# Patient Record
Sex: FEMALE | Race: WHITE | NOT HISPANIC OR LATINO | Employment: OTHER | URBAN - METROPOLITAN AREA
[De-identification: names, ages, dates, MRNs, and addresses within clinical notes are randomized per-mention and may not be internally consistent; named-entity substitution may affect disease eponyms.]

---

## 2017-07-28 PROBLEM — E89.2 S/P PARATHYROIDECTOMY: Status: ACTIVE | Noted: 2017-07-28

## 2017-07-28 PROBLEM — Z98.890 S/P PARATHYROIDECTOMY: Status: ACTIVE | Noted: 2017-07-28

## 2017-07-28 PROBLEM — Z90.89 S/P PARATHYROIDECTOMY: Status: ACTIVE | Noted: 2017-07-28

## 2022-06-03 ENCOUNTER — TELEPHONE (OUTPATIENT)
Dept: FAMILY MEDICINE CLINIC | Facility: CLINIC | Age: 57
End: 2022-06-03

## 2022-06-03 NOTE — TELEPHONE ENCOUNTER
Caller: Tea Leggett    Relationship: Self    Best call back number: [phone]    What is the best time to reach you: BY THIS EVENING    Who are you requesting to speak with (clinical staff, provider,  specific staff member): PROVIDER    What was the call regarding: PATIENT HAS A KNOT THAT IS MORE LIKE A BONE SPUR THAT HAS POPPED UP AND IS VERY PAINFUL TO WHERE SHE CAN'T HARDLY PUT A SHOE/FLIPFLOP OR SANDAL ON.  PLUS SHE IS HAVING NUMBNESS IN HER PINKY TOE.    Do you require a callback: YES PLEASE CALL HER BACK.

## 2022-11-03 ENCOUNTER — TELEPHONE (OUTPATIENT)
Dept: FAMILY MEDICINE CLINIC | Facility: CLINIC | Age: 57
End: 2022-11-03

## 2023-01-12 ENCOUNTER — TELEPHONE (OUTPATIENT)
Dept: FAMILY MEDICINE CLINIC | Facility: CLINIC | Age: 58
End: 2023-01-12

## 2023-01-12 NOTE — TELEPHONE ENCOUNTER
Hub staff attempted to follow warm transfer process and was unsuccessful     Caller: Tea Leggett    Relationship to patient: Self    Best call back number: [phone]    Patient is needing: PLEASE CHANGE PATIENT'S LABS TO Monday MORNING AROUND 9 AM.

## 2023-01-13 ENCOUNTER — TELEPHONE (OUTPATIENT)
Dept: FAMILY MEDICINE CLINIC | Facility: CLINIC | Age: 58
End: 2023-01-13

## 2023-01-13 NOTE — TELEPHONE ENCOUNTER
PATIENT CALLED FOR A STATUS ON THIS BECAUSE SHE STATED SHE NEEDS A RESPONSE BEFORE THE WEEKEND.    PLEASE ADVISE PATIENT ASAP.    CONTACT: [phone] (mobile)

## 2023-01-13 NOTE — TELEPHONE ENCOUNTER
Caller: Tea Leggett    Relationship: Self    Best call back number: [phone]    What was the call regarding: PATIENT WAS CALLING TO CHECK ON THE TEST RESULTS FOR HER URINE ANALYSIS. THE CURRENT MEDICATION THAT MARCOS GAVE HER IS NOT HELPING, SHE STILL HAS VAGINAL DISCHARGE AND IS STILL IN PAIN WHEN SHE URINATES.     PLEASE ADVISE.     Do you require a callback: YES

## 2023-05-10 ENCOUNTER — OFFICE VISIT (OUTPATIENT)
Dept: FAMILY MEDICINE CLINIC | Facility: CLINIC | Age: 58
End: 2023-05-10
Payer: MEDICARE

## 2023-05-10 VITALS
RESPIRATION RATE: 14 BRPM | TEMPERATURE: 97.6 F | DIASTOLIC BLOOD PRESSURE: 73 MMHG | HEIGHT: 62 IN | HEART RATE: 76 BPM | SYSTOLIC BLOOD PRESSURE: 144 MMHG | OXYGEN SATURATION: 99 % | BODY MASS INDEX: 30.51 KG/M2 | WEIGHT: 165.8 LBS

## 2023-05-10 DIAGNOSIS — B00.9 HERPES: ICD-10-CM

## 2023-05-10 DIAGNOSIS — R10.9 ABDOMINAL PAIN, UNSPECIFIED ABDOMINAL LOCATION: Primary | ICD-10-CM

## 2023-05-10 LAB
BILIRUB BLD-MCNC: NEGATIVE MG/DL
CLARITY, POC: ABNORMAL
COLOR UR: YELLOW
EXPIRATION DATE: ABNORMAL
GLUCOSE UR STRIP-MCNC: NEGATIVE MG/DL
KETONES UR QL: NEGATIVE
LEUKOCYTE EST, POC: NEGATIVE
Lab: ABNORMAL
NITRITE UR-MCNC: NEGATIVE MG/ML
PH UR: 7 [PH] (ref 5–8)
PROT UR STRIP-MCNC: NEGATIVE MG/DL
RBC # UR STRIP: ABNORMAL /UL
SP GR UR: 1.01 (ref 1–1.03)
UROBILINOGEN UR QL: ABNORMAL

## 2023-05-10 PROCEDURE — 99214 OFFICE O/P EST MOD 30 MIN: CPT | Performed by: NURSE PRACTITIONER

## 2023-05-10 PROCEDURE — 3078F DIAST BP <80 MM HG: CPT | Performed by: NURSE PRACTITIONER

## 2023-05-10 PROCEDURE — 81003 URINALYSIS AUTO W/O SCOPE: CPT | Performed by: NURSE PRACTITIONER

## 2023-05-10 PROCEDURE — 3077F SYST BP >= 140 MM HG: CPT | Performed by: NURSE PRACTITIONER

## 2023-05-10 RX ORDER — VALACYCLOVIR HYDROCHLORIDE 500 MG/1
500 TABLET, FILM COATED ORAL DAILY
Qty: 90 TABLET | Refills: 3 | Status: SHIPPED | OUTPATIENT
Start: 2023-05-10

## 2023-05-10 RX ORDER — LEVOTHYROXINE SODIUM 0.1 MG/1
100 TABLET ORAL DAILY
Qty: 90 TABLET | Refills: 2 | Status: SHIPPED | OUTPATIENT
Start: 2023-05-10

## 2023-05-10 NOTE — PROGRESS NOTES
"Chief Complaint  Abdominal Pain and Back Pain    Subjective        Tea DANIELS presents to [facility]  History of Present Illness  Recent diagnosis of herpes, needs valacyclovir gets for 5 episodes a year.  Already notified ex-partner.    Sunday she had some mid pelvic pain suprapubic pain that radiated to her back, some crampy-like pain felt like a menstrual cycle but she does not have her uterus she still has ovaries but she had no fever chills other associated symptoms really no dysuria frequency urgency or bowel or bladder issues, her appetite remains good and thankfully pain has completely resolved she went ahead and kept her appointment as she needed some  Prophylactic therapy for recurrent HSV infection.  She has no active HSV infection no discharge,  Her other acute issues.    Recently she was found to have herpes,    For her posterior lower back confirmed with culture  More in her mid back with her buttock crevice  She believes she has had some recurrent symptoms over the year   She discussed taking Valtrex daily  With previous provider and would like this to be prescribed    Abdominal Pain    Back Pain  Associated symptoms include abdominal pain.       Objective   Vital Signs:  /73   Pulse 76   Temp 97.6 °F (36.4 °C) (Temporal)   Resp 14   Ht 157.5 cm (62\")   Wt 75.2 kg (165 lb 12.8 oz)   SpO2 99%   BMI 30.33 kg/m²   Estimated body mass index is 30.33 kg/m² as calculated from the following:    Height as of this encounter: 157.5 cm (62\").    Weight as of this encounter: 75.2 kg (165 lb 12.8 oz).          Physical Exam  Vitals reviewed.   Constitutional:       General: She is not in acute distress.     Appearance: Normal appearance. She is well-developed. She is not ill-appearing, toxic-appearing or diaphoretic.   HENT:      Head: Normocephalic.      Nose: Nose normal.   Eyes:      General: No scleral icterus.     Conjunctiva/sclera: Conjunctivae normal.      " Pupils: Pupils are equal, round, and reactive to light.   Neck:      Thyroid: No thyromegaly.      Vascular: No JVD.   Cardiovascular:      Rate and Rhythm: Normal rate and regular rhythm.      Heart sounds: Normal heart sounds. No murmur heard.    No friction rub. No gallop.   Pulmonary:      Effort: Pulmonary effort is normal. No respiratory distress.      Breath sounds: Normal breath sounds. No stridor. No wheezing or rales.   Abdominal:      General: Bowel sounds are normal. There is no distension.      Palpations: Abdomen is soft.      Tenderness: There is no abdominal tenderness.      Comments: No hepatosplenomegaly, no ascites, normal exam no tenderness, no significant pelvic tenderness or suprapubic tenderness no distention of her bladder no guarding or rebound normal exam no CVA tenderness   Musculoskeletal:         General: No tenderness.      Cervical back: Neck supple.   Lymphadenopathy:      Cervical: No cervical adenopathy.   Skin:     General: Skin is warm and dry.      Findings: No erythema or rash.   Neurological:      General: No focal deficit present.      Mental Status: She is alert and oriented to person, place, and time. Mental status is at baseline.      Deep Tendon Reflexes: Reflexes are normal and symmetric.   Psychiatric:         Mood and Affect: Mood normal.         Behavior: Behavior normal.         Thought Content: Thought content normal.         Judgment: Judgment normal.        Result Review :                Assessment and Plan   Diagnoses and all orders for this visit:    1. Abdominal pain, unspecified abdominal location (Primary)  Comments:  resolved  Orders:  -     POC Urinalysis Dipstick, Automated    2. Herpes  -     valACYclovir (Valtrex) 500 MG tablet; Take 1 tablet by mouth Daily.  Dispense: 90 tablet; Refill: 3    Other orders  -     levothyroxine (SYNTHROID, LEVOTHROID) 100 MCG tablet; Take 1 tablet by mouth Daily.  Dispense: 90 tablet; Refill: 2             Follow Up   No  follow-ups on file.  Patient was given instructions and counseling regarding her condition or for health maintenance advice. Please see specific information pulled into the AVS if appropriate.     There are no Patient Instructions on file for this visit.     Discharge instructions patient continue present plan,  Abdominal pain has resolved  And she is having no pelvic pain pressure nausea vomiting fever chest pain shortness of breath flank pain or other issues she has no active infection  For prevention of recurrent herpes simplex virus, Valtrex 500 mg daily  We will start lower dose if need be increased to 1000 the liver kidney function yearly  Continue present thyroid, continue healthy diet and exercise recheck blood pressure follow-up for routine physicals make sure she is up-to-date with her screenings as well mammogram etc.  Any recurrent abdominal pain she will need a CAT scan further evaluation any severe pain fever chills emergency room

## 2023-05-31 ENCOUNTER — TELEPHONE (OUTPATIENT)
Dept: FAMILY MEDICINE CLINIC | Facility: CLINIC | Age: 58
End: 2023-05-31

## 2023-05-31 NOTE — TELEPHONE ENCOUNTER
Hub staff attempted to follow warm transfer process and was unsuccessful     Caller: Tea DANIELS    Relationship to patient: Self    Best call back number: [phone]    Patient is needing: PATIENT STATED SHE THOUGHT SHE HAD A LAB APPOINTMENT THIS Friday 06/02/23, BUT RECEIVED A NOTIFICATION THAT SHE HAS AN OFFICE VISIT.    SHE STATED SHE HAS AN APPOINTMENT NEXT Friday 06/09/23 FOR A 6 MONTH FOLLOW UP.    SHE WOULD LIKE TO CONFIRM THAT THIS Friday IS LABS ONLY.    PLEASE CALL.

## 2023-06-02 DIAGNOSIS — I10 ESSENTIAL HYPERTENSION: ICD-10-CM

## 2023-06-02 DIAGNOSIS — N30.00 ACUTE CYSTITIS WITHOUT HEMATURIA: ICD-10-CM

## 2023-06-03 LAB
ALBUMIN SERPL-MCNC: 4.5 G/DL (ref 3.5–5.2)
ALBUMIN/GLOB SERPL: 2.4 G/DL
ALP SERPL-CCNC: 119 U/L (ref 39–117)
ALT SERPL-CCNC: 40 U/L (ref 1–33)
AST SERPL-CCNC: 33 U/L (ref 1–32)
BASOPHILS # BLD AUTO: 0.04 10*3/MM3 (ref 0–0.2)
BASOPHILS NFR BLD AUTO: 0.7 % (ref 0–1.5)
BILIRUB SERPL-MCNC: 0.4 MG/DL (ref 0–1.2)
BUN SERPL-MCNC: 13 MG/DL (ref 6–20)
BUN/CREAT SERPL: 14.6 (ref 7–25)
CALCIUM SERPL-MCNC: 9.8 MG/DL (ref 8.6–10.5)
CHLORIDE SERPL-SCNC: 108 MMOL/L (ref 98–107)
CHOLEST SERPL-MCNC: 234 MG/DL (ref 0–200)
CO2 SERPL-SCNC: 26.9 MMOL/L (ref 22–29)
CREAT SERPL-MCNC: 0.89 MG/DL (ref 0.57–1)
EGFRCR SERPLBLD CKD-EPI 2021: 76.7 ML/MIN/1.73
EOSINOPHIL # BLD AUTO: 0.13 10*3/MM3 (ref 0–0.4)
EOSINOPHIL NFR BLD AUTO: 2.1 % (ref 0.3–6.2)
ERYTHROCYTE [DISTWIDTH] IN BLOOD BY AUTOMATED COUNT: 13.1 % (ref 12.3–15.4)
GLOBULIN SER CALC-MCNC: 1.9 GM/DL
GLUCOSE SERPL-MCNC: 86 MG/DL (ref 65–99)
HCT VFR BLD AUTO: 36.8 % (ref 34–46.6)
HDLC SERPL-MCNC: 114 MG/DL (ref 40–60)
HGB BLD-MCNC: 12.4 G/DL (ref 12–15.9)
IMM GRANULOCYTES # BLD AUTO: 0.01 10*3/MM3 (ref 0–0.05)
IMM GRANULOCYTES NFR BLD AUTO: 0.2 % (ref 0–0.5)
LDLC SERPL CALC-MCNC: 110 MG/DL (ref 0–100)
LDLC/HDLC SERPL: 0.95 {RATIO}
LYMPHOCYTES # BLD AUTO: 2.4 10*3/MM3 (ref 0.7–3.1)
LYMPHOCYTES NFR BLD AUTO: 39 % (ref 19.6–45.3)
MCH RBC QN AUTO: 32.4 PG (ref 26.6–33)
MCHC RBC AUTO-ENTMCNC: 33.7 G/DL (ref 31.5–35.7)
MCV RBC AUTO: 96.1 FL (ref 79–97)
MONOCYTES # BLD AUTO: 0.57 10*3/MM3 (ref 0.1–0.9)
MONOCYTES NFR BLD AUTO: 9.3 % (ref 5–12)
NEUTROPHILS # BLD AUTO: 3 10*3/MM3 (ref 1.7–7)
NEUTROPHILS NFR BLD AUTO: 48.7 % (ref 42.7–76)
NRBC BLD AUTO-RTO: 0 /100 WBC (ref 0–0.2)
PLATELET # BLD AUTO: 249 10*3/MM3 (ref 140–450)
POTASSIUM SERPL-SCNC: 4.2 MMOL/L (ref 3.5–5.2)
PROT SERPL-MCNC: 6.4 G/DL (ref 6–8.5)
RBC # BLD AUTO: 3.83 10*6/MM3 (ref 3.77–5.28)
SODIUM SERPL-SCNC: 145 MMOL/L (ref 136–145)
TRIGL SERPL-MCNC: 57 MG/DL (ref 0–150)
TSH SERPL DL<=0.005 MIU/L-ACNC: 0.72 UIU/ML (ref 0.27–4.2)
UNABLE TO VOID: NORMAL
VLDLC SERPL CALC-MCNC: 10 MG/DL (ref 5–40)
WBC # BLD AUTO: 6.15 10*3/MM3 (ref 3.4–10.8)

## 2023-06-09 ENCOUNTER — OFFICE VISIT (OUTPATIENT)
Dept: FAMILY MEDICINE CLINIC | Facility: CLINIC | Age: 58
End: 2023-06-09
Payer: MEDICARE

## 2023-06-09 VITALS
HEIGHT: 62 IN | WEIGHT: 173.7 LBS | TEMPERATURE: 97.6 F | BODY MASS INDEX: 31.96 KG/M2 | HEART RATE: 78 BPM | RESPIRATION RATE: 14 BRPM | DIASTOLIC BLOOD PRESSURE: 60 MMHG | OXYGEN SATURATION: 98 % | SYSTOLIC BLOOD PRESSURE: 126 MMHG

## 2023-06-09 DIAGNOSIS — Z12.31 ENCOUNTER FOR SCREENING MAMMOGRAM FOR BREAST CANCER: ICD-10-CM

## 2023-06-09 DIAGNOSIS — E78.5 HYPERLIPIDEMIA, UNSPECIFIED HYPERLIPIDEMIA TYPE: ICD-10-CM

## 2023-06-09 DIAGNOSIS — Z78.0 POSTMENOPAUSAL: ICD-10-CM

## 2023-06-09 DIAGNOSIS — I10 ESSENTIAL HYPERTENSION: Primary | ICD-10-CM

## 2023-06-09 DIAGNOSIS — R79.89 ELEVATED LIVER FUNCTION TESTS: ICD-10-CM

## 2023-06-09 DIAGNOSIS — E03.9 ACQUIRED HYPOTHYROIDISM: ICD-10-CM

## 2023-06-09 PROCEDURE — 99213 OFFICE O/P EST LOW 20 MIN: CPT | Performed by: NURSE PRACTITIONER

## 2023-06-09 PROCEDURE — 3074F SYST BP LT 130 MM HG: CPT | Performed by: NURSE PRACTITIONER

## 2023-06-09 PROCEDURE — 3078F DIAST BP <80 MM HG: CPT | Performed by: NURSE PRACTITIONER

## 2023-06-09 RX ORDER — DEXTROMETHORPHAN HBR 15 MG/1
15 CAPSULE, LIQUID FILLED ORAL
COMMUNITY

## 2023-06-09 RX ORDER — FLUOXETINE 10 MG/1
10 CAPSULE ORAL EVERY OTHER DAY
COMMUNITY

## 2023-06-09 NOTE — PROGRESS NOTES
"Chief Complaint  Hypertension    Subjective        Tea DANIELS presents to [facility]  History of Present Illness  Pleasant patient here today follow-up essential hypertension controlled  Mixed hyperlipidemia mildly elevated LDL but high HDL  Recent labs showed mildly elevated liver function test asked  Patient says she eats way too much fast food has for years, does not think she can change too much right now does not like to cook  Acquired hypothyroid stable  Mood stable recently saw psychiatry added fluoxetine low-dose but thinks it increased her appetite  Prazosin for anxiety mood, added and HCT was discontinued with psychiatry  Hypertension      Objective   Vital Signs:  /60   Pulse 78   Temp 97.6 °F (36.4 °C) (Temporal)   Resp 14   Ht 157.5 cm (62\")   Wt 78.8 kg (173 lb 11.2 oz)   SpO2 98%   BMI 31.77 kg/m²   Estimated body mass index is 31.77 kg/m² as calculated from the following:    Height as of this encounter: 157.5 cm (62\").    Weight as of this encounter: 78.8 kg (173 lb 11.2 oz).          Physical Exam  Vitals reviewed.   Constitutional:       General: She is not in acute distress.     Appearance: Normal appearance. She is well-developed. She is not ill-appearing, toxic-appearing or diaphoretic.   HENT:      Head: Normocephalic.      Nose: Nose normal.   Eyes:      General: No scleral icterus.     Conjunctiva/sclera: Conjunctivae normal.      Pupils: Pupils are equal, round, and reactive to light.   Neck:      Thyroid: No thyromegaly.      Vascular: No JVD.   Cardiovascular:      Rate and Rhythm: Normal rate and regular rhythm.      Heart sounds: Normal heart sounds. No murmur heard.    No friction rub. No gallop.   Pulmonary:      Effort: Pulmonary effort is normal. No respiratory distress.      Breath sounds: Normal breath sounds. No stridor. No wheezing or rales.   Abdominal:      General: Bowel sounds are normal. There is no distension.      " Palpations: Abdomen is soft.      Tenderness: There is no abdominal tenderness.      Comments: No hepatosplenomegaly, no ascites,   Musculoskeletal:         General: No tenderness.      Cervical back: Neck supple.   Lymphadenopathy:      Cervical: No cervical adenopathy.   Skin:     General: Skin is warm and dry.      Findings: No erythema or rash.   Neurological:      General: No focal deficit present.      Mental Status: She is alert and oriented to person, place, and time. Mental status is at baseline.      Deep Tendon Reflexes: Reflexes are normal and symmetric.   Psychiatric:         Mood and Affect: Mood normal.         Behavior: Behavior normal.         Thought Content: Thought content normal.         Judgment: Judgment normal.      Result Review :                Assessment and Plan   Diagnoses and all orders for this visit:    1. Essential hypertension (Primary)  -     Comprehensive metabolic panel; Future    2. Acquired hypothyroidism    3. Postmenopausal  -     DEXA Bone Density Axial    4. Encounter for screening mammogram for breast cancer  -     Mammo Screening Bilateral With CAD    5. Hyperlipidemia, unspecified hyperlipidemia type    6. Elevated liver function tests  -     US Liver  -     Comprehensive metabolic panel; Future             Follow Up   Return in about 6 months (around 12/9/2023) for Labs before next visit.  Patient was given instructions and counseling regarding her condition or for health maintenance advice. Please see specific information pulled into the AVS if appropriate.                 Patient Instructions   Discharge instructions  Continue healthy diet exercise joint sparing,  Liver function test was elevated it may be due to high fatty diet high carbohydrate diet likely  Lets check an ultrasound  As well as repeat this 3 months outpatient if still elevated you will need to see gastroenterology for evaluation    As long as you are doing well and your blood pressures controlled and  you are feeling well I will see you back in 6 months get your mammogram and DEXA scan this fall  Lots of fluids consider intermittent fasting  64 ounces of water daily more vegetables less bread less pasta less rice potato tortilla  Chicken fish more of modest red meat.    Keep calories around 1400  Low as 1200

## 2023-06-09 NOTE — PATIENT INSTRUCTIONS
Discharge instructions  Continue healthy diet exercise joint sparing,  Liver function test was elevated it may be due to high fatty diet high carbohydrate diet likely  Lets check an ultrasound  As well as repeat this 3 months outpatient if still elevated you will need to see gastroenterology for evaluation    As long as you are doing well and your blood pressures controlled and you are feeling well I will see you back in 6 months get your mammogram and DEXA scan this fall  Lots of fluids consider intermittent fasting  64 ounces of water daily more vegetables less bread less pasta less rice potato tortilla  Chicken fish more of modest red meat.    Keep calories around 1400  Low as 1200

## 2023-06-15 ENCOUNTER — TRANSCRIBE ORDERS (OUTPATIENT)
Dept: ADMINISTRATIVE | Facility: HOSPITAL | Age: 58
End: 2023-06-15
Payer: MEDICARE

## 2023-06-15 DIAGNOSIS — Z12.31 BREAST CANCER SCREENING BY MAMMOGRAM: Primary | ICD-10-CM

## 2024-01-11 ENCOUNTER — TELEPHONE (OUTPATIENT)
Dept: ORTHOPEDIC SURGERY | Facility: HOSPITAL | Age: 59
End: 2024-01-11
Payer: MEDICARE

## 2024-01-11 RX ORDER — LOSARTAN POTASSIUM 25 MG/1
TABLET ORAL
Qty: 30 TABLET | Refills: 5 | Status: SHIPPED | OUTPATIENT
Start: 2024-01-11

## 2024-01-11 NOTE — TELEPHONE ENCOUNTER
Risk Factor yes no   Age >75  X   BMI <20 >40  X   Patient History     Chronic Pain (2 or more meds/Pain Management)  X   ETOH (more than 3 drinks Daily)  X   Uncontrolled Depression/Bipolar/Schizoaffective Disorder  X   Arrhythmias--  X   Stent placement/MI  X   DVT/PE  X   Pacemaker  X   HTN (uncontrolled or requiring more than 2 medications)  X   CHF/Retained fluids/Edema  X   Stroke with Residual   X   COPD/Asthma  X   ISSA--Non-compliant with CPAP  X   Diabetes (on insulin or more than 2 meds)         A1C:  X   BPH/Urinary retention (on medication)  X   CKD  X   Home environment and support     Current ambulation status (use of cane, walker, W/C, Multiple falls/weakness)  X   Stairs to enter and throughout home X    Lives Alone X    Doesn't have support at home  X   Outpatient Screening Assessment    Home needs: (Walker/BSC):  Has walker  ? Steps 9 steps   Caregiver 24-48hrs post-discharge: Lives with friends in separate apartments, she lives in the basement. Her son will also be available to help.     Discharge Plan:   [facility]    Prescriptions: Meds to bed    Home medications:   [] Blood thinner/anti-coag therapy--   [] BPH or diuretic--  ? BP meds-- Losartan   [] Pain/Anti-inflammatories--  Pre-op Education:  Educate patient on spinal anesthesia/pain control:  ? patient verbalize understanding    Educate patient on hospital course/timeline:  ?  patient verbalize understanding    Joint Care Class:  []  yes ? no  Notes:

## 2024-01-12 ENCOUNTER — ANESTHESIA EVENT (OUTPATIENT)
Dept: PERIOP | Facility: HOSPITAL | Age: 59
End: 2024-01-12
Payer: MEDICARE

## 2024-01-15 ENCOUNTER — HOSPITAL ENCOUNTER (OUTPATIENT)
Facility: HOSPITAL | Age: 59
Setting detail: SURGERY ADMIT
Discharge: HOME HEALTH | End: 2024-01-15
Attending: ORTHOPAEDIC SURGERY | Admitting: ORTHOPAEDIC SURGERY
Payer: MEDICARE

## 2024-01-15 ENCOUNTER — APPOINTMENT (OUTPATIENT)
Dept: GENERAL RADIOLOGY | Facility: HOSPITAL | Age: 59
End: 2024-01-15
Payer: MEDICARE

## 2024-01-15 ENCOUNTER — ANESTHESIA (OUTPATIENT)
Dept: PERIOP | Facility: HOSPITAL | Age: 59
End: 2024-01-15
Payer: MEDICARE

## 2024-01-15 VITALS
WEIGHT: 179 LBS | SYSTOLIC BLOOD PRESSURE: 137 MMHG | OXYGEN SATURATION: 99 % | TEMPERATURE: 97.9 F | BODY MASS INDEX: 33.79 KG/M2 | HEIGHT: 61 IN | RESPIRATION RATE: 20 BRPM | DIASTOLIC BLOOD PRESSURE: 71 MMHG | HEART RATE: 68 BPM

## 2024-01-15 DIAGNOSIS — T84.018A FAILED TOTAL KNEE ARTHROPLASTY, INITIAL ENCOUNTER: Primary | ICD-10-CM

## 2024-01-15 DIAGNOSIS — Z96.659 FAILED TOTAL KNEE ARTHROPLASTY, INITIAL ENCOUNTER: Primary | ICD-10-CM

## 2024-01-15 PROCEDURE — 25010000002 DROPERIDOL PER 5 MG

## 2024-01-15 PROCEDURE — C1776 JOINT DEVICE (IMPLANTABLE): HCPCS | Performed by: ORTHOPAEDIC SURGERY

## 2024-01-15 PROCEDURE — 97110 THERAPEUTIC EXERCISES: CPT

## 2024-01-15 PROCEDURE — 25010000002 HYDROMORPHONE 1 MG/ML SOLUTION

## 2024-01-15 PROCEDURE — 97161 PT EVAL LOW COMPLEX 20 MIN: CPT

## 2024-01-15 PROCEDURE — 25010000002 DEXAMETHASONE PER 1 MG: Performed by: ANESTHESIOLOGY

## 2024-01-15 PROCEDURE — 25010000002 EPINEPHRINE 1 MG/ML SOLUTION 30 ML VIAL: Performed by: ORTHOPAEDIC SURGERY

## 2024-01-15 PROCEDURE — 25010000002 MORPHINE PER 10 MG: Performed by: ORTHOPAEDIC SURGERY

## 2024-01-15 PROCEDURE — 25010000002 MIDAZOLAM PER 1 MG: Performed by: ANESTHESIOLOGY

## 2024-01-15 PROCEDURE — 25010000002 ONDANSETRON PER 1 MG

## 2024-01-15 PROCEDURE — 25010000002 KETOROLAC TROMETHAMINE PER 15 MG: Performed by: ORTHOPAEDIC SURGERY

## 2024-01-15 PROCEDURE — 25010000002 FENTANYL CITRATE (PF) 100 MCG/2ML SOLUTION

## 2024-01-15 PROCEDURE — 25010000002 CEFAZOLIN IN DEXTROSE 2-4 GM/100ML-% SOLUTION: Performed by: ORTHOPAEDIC SURGERY

## 2024-01-15 PROCEDURE — 25810000003 LACTATED RINGERS PER 1000 ML: Performed by: ANESTHESIOLOGY

## 2024-01-15 PROCEDURE — 25010000002 PROPOFOL 10 MG/ML EMULSION

## 2024-01-15 PROCEDURE — 25010000002 SUGAMMADEX 200 MG/2ML SOLUTION

## 2024-01-15 PROCEDURE — 25010000002 ROPIVACAINE PER 1 MG: Performed by: ANESTHESIOLOGY

## 2024-01-15 PROCEDURE — 25010000002 FENTANYL CITRATE (PF) 50 MCG/ML SOLUTION

## 2024-01-15 PROCEDURE — 25010000002 DEXAMETHASONE SODIUM PHOSPHATE 20 MG/5ML SOLUTION

## 2024-01-15 PROCEDURE — 25010000002 FENTANYL CITRATE (PF) 50 MCG/ML SOLUTION: Performed by: ANESTHESIOLOGY

## 2024-01-15 PROCEDURE — 25010000002 PROPOFOL 200 MG/20ML EMULSION

## 2024-01-15 PROCEDURE — 25010000002 ROPIVACAINE PER 1 MG: Performed by: ORTHOPAEDIC SURGERY

## 2024-01-15 PROCEDURE — 73560 X-RAY EXAM OF KNEE 1 OR 2: CPT

## 2024-01-15 DEVICE — DEV CONTRL TISS STRATAFIX SYMM PDS PLUS VIL CT-1 45CM: Type: IMPLANTABLE DEVICE | Site: KNEE | Status: FUNCTIONAL

## 2024-01-15 DEVICE — IMPLANTABLE DEVICE: Type: IMPLANTABLE DEVICE | Site: KNEE | Status: FUNCTIONAL

## 2024-01-15 DEVICE — DEV CONTRL TISS STRATAFIX SPIRAL MNCRYL UD 3/0 PLS 30CM: Type: IMPLANTABLE DEVICE | Site: KNEE | Status: FUNCTIONAL

## 2024-01-15 RX ORDER — HYDRALAZINE HYDROCHLORIDE 20 MG/ML
5 INJECTION INTRAMUSCULAR; INTRAVENOUS
Status: DISCONTINUED | OUTPATIENT
Start: 2024-01-15 | End: 2024-01-15 | Stop reason: HOSPADM

## 2024-01-15 RX ORDER — PROPOFOL 10 MG/ML
INJECTION, EMULSION INTRAVENOUS AS NEEDED
Status: DISCONTINUED | OUTPATIENT
Start: 2024-01-15 | End: 2024-01-15 | Stop reason: SURG

## 2024-01-15 RX ORDER — FLUMAZENIL 0.1 MG/ML
0.2 INJECTION INTRAVENOUS AS NEEDED
Status: DISCONTINUED | OUTPATIENT
Start: 2024-01-15 | End: 2024-01-15 | Stop reason: HOSPADM

## 2024-01-15 RX ORDER — LABETALOL HYDROCHLORIDE 5 MG/ML
5 INJECTION, SOLUTION INTRAVENOUS
Status: DISCONTINUED | OUTPATIENT
Start: 2024-01-15 | End: 2024-01-15 | Stop reason: HOSPADM

## 2024-01-15 RX ORDER — SODIUM CHLORIDE 0.9 % (FLUSH) 0.9 %
3-10 SYRINGE (ML) INJECTION AS NEEDED
Status: DISCONTINUED | OUTPATIENT
Start: 2024-01-15 | End: 2024-01-15 | Stop reason: HOSPADM

## 2024-01-15 RX ORDER — LIDOCAINE HYDROCHLORIDE 20 MG/ML
INJECTION, SOLUTION EPIDURAL; INFILTRATION; INTRACAUDAL; PERINEURAL AS NEEDED
Status: DISCONTINUED | OUTPATIENT
Start: 2024-01-15 | End: 2024-01-15 | Stop reason: SURG

## 2024-01-15 RX ORDER — ACETAMINOPHEN 500 MG
1000 TABLET ORAL ONCE
Status: COMPLETED | OUTPATIENT
Start: 2024-01-15 | End: 2024-01-15

## 2024-01-15 RX ORDER — HYDROMORPHONE HYDROCHLORIDE 1 MG/ML
0.5 INJECTION, SOLUTION INTRAMUSCULAR; INTRAVENOUS; SUBCUTANEOUS
Status: DISCONTINUED | OUTPATIENT
Start: 2024-01-15 | End: 2024-01-15 | Stop reason: HOSPADM

## 2024-01-15 RX ORDER — MELOXICAM 15 MG/1
15 TABLET ORAL ONCE
Status: COMPLETED | OUTPATIENT
Start: 2024-01-15 | End: 2024-01-15

## 2024-01-15 RX ORDER — EPHEDRINE SULFATE 50 MG/ML
5 INJECTION, SOLUTION INTRAVENOUS ONCE AS NEEDED
Status: DISCONTINUED | OUTPATIENT
Start: 2024-01-15 | End: 2024-01-15 | Stop reason: HOSPADM

## 2024-01-15 RX ORDER — ROPIVACAINE HYDROCHLORIDE 5 MG/ML
INJECTION, SOLUTION EPIDURAL; INFILTRATION; PERINEURAL
Status: COMPLETED | OUTPATIENT
Start: 2024-01-15 | End: 2024-01-15

## 2024-01-15 RX ORDER — MIDAZOLAM HYDROCHLORIDE 1 MG/ML
1 INJECTION INTRAMUSCULAR; INTRAVENOUS
Status: DISCONTINUED | OUTPATIENT
Start: 2024-01-15 | End: 2024-01-15 | Stop reason: HOSPADM

## 2024-01-15 RX ORDER — ONDANSETRON 2 MG/ML
4 INJECTION INTRAMUSCULAR; INTRAVENOUS ONCE AS NEEDED
Status: DISCONTINUED | OUTPATIENT
Start: 2024-01-15 | End: 2024-01-15 | Stop reason: HOSPADM

## 2024-01-15 RX ORDER — HYDROCODONE BITARTRATE AND ACETAMINOPHEN 5; 325 MG/1; MG/1
1 TABLET ORAL ONCE AS NEEDED
Status: DISCONTINUED | OUTPATIENT
Start: 2024-01-15 | End: 2024-01-15 | Stop reason: HOSPADM

## 2024-01-15 RX ORDER — DOCUSATE SODIUM 100 MG/1
200 CAPSULE, LIQUID FILLED ORAL 2 TIMES DAILY
Qty: 60 CAPSULE | Refills: 1 | Status: SHIPPED | OUTPATIENT
Start: 2024-01-15

## 2024-01-15 RX ORDER — LIDOCAINE HYDROCHLORIDE 10 MG/ML
0.5 INJECTION, SOLUTION INFILTRATION; PERINEURAL ONCE AS NEEDED
Status: DISCONTINUED | OUTPATIENT
Start: 2024-01-15 | End: 2024-01-15 | Stop reason: HOSPADM

## 2024-01-15 RX ORDER — CEFAZOLIN SODIUM 2 G/100ML
2000 INJECTION, SOLUTION INTRAVENOUS ONCE
Status: COMPLETED | OUTPATIENT
Start: 2024-01-15 | End: 2024-01-15

## 2024-01-15 RX ORDER — SODIUM CHLORIDE 0.9 % (FLUSH) 0.9 %
3 SYRINGE (ML) INJECTION EVERY 12 HOURS SCHEDULED
Status: DISCONTINUED | OUTPATIENT
Start: 2024-01-15 | End: 2024-01-15 | Stop reason: HOSPADM

## 2024-01-15 RX ORDER — ASPIRIN 81 MG/1
81 TABLET ORAL 2 TIMES DAILY
Qty: 60 TABLET | Refills: 0 | Status: SHIPPED | OUTPATIENT
Start: 2024-01-15 | End: 2024-02-14

## 2024-01-15 RX ORDER — FENTANYL CITRATE 50 UG/ML
50 INJECTION, SOLUTION INTRAMUSCULAR; INTRAVENOUS
Status: DISCONTINUED | OUTPATIENT
Start: 2024-01-15 | End: 2024-01-15 | Stop reason: HOSPADM

## 2024-01-15 RX ORDER — DEXAMETHASONE SODIUM PHOSPHATE 4 MG/ML
INJECTION, SOLUTION INTRA-ARTICULAR; INTRALESIONAL; INTRAMUSCULAR; INTRAVENOUS; SOFT TISSUE
Status: COMPLETED | OUTPATIENT
Start: 2024-01-15 | End: 2024-01-15

## 2024-01-15 RX ORDER — MAGNESIUM HYDROXIDE 1200 MG/15ML
LIQUID ORAL AS NEEDED
Status: DISCONTINUED | OUTPATIENT
Start: 2024-01-15 | End: 2024-01-15 | Stop reason: HOSPADM

## 2024-01-15 RX ORDER — IPRATROPIUM BROMIDE AND ALBUTEROL SULFATE 2.5; .5 MG/3ML; MG/3ML
3 SOLUTION RESPIRATORY (INHALATION) ONCE AS NEEDED
Status: DISCONTINUED | OUTPATIENT
Start: 2024-01-15 | End: 2024-01-15 | Stop reason: HOSPADM

## 2024-01-15 RX ORDER — ONDANSETRON 2 MG/ML
INJECTION INTRAMUSCULAR; INTRAVENOUS AS NEEDED
Status: DISCONTINUED | OUTPATIENT
Start: 2024-01-15 | End: 2024-01-15 | Stop reason: SURG

## 2024-01-15 RX ORDER — FENTANYL CITRATE 50 UG/ML
INJECTION, SOLUTION INTRAMUSCULAR; INTRAVENOUS AS NEEDED
Status: DISCONTINUED | OUTPATIENT
Start: 2024-01-15 | End: 2024-01-15 | Stop reason: SURG

## 2024-01-15 RX ORDER — DEXAMETHASONE SODIUM PHOSPHATE 4 MG/ML
INJECTION, SOLUTION INTRA-ARTICULAR; INTRALESIONAL; INTRAMUSCULAR; INTRAVENOUS; SOFT TISSUE AS NEEDED
Status: DISCONTINUED | OUTPATIENT
Start: 2024-01-15 | End: 2024-01-15 | Stop reason: SURG

## 2024-01-15 RX ORDER — NALOXONE HCL 0.4 MG/ML
0.2 VIAL (ML) INJECTION AS NEEDED
Status: DISCONTINUED | OUTPATIENT
Start: 2024-01-15 | End: 2024-01-15 | Stop reason: HOSPADM

## 2024-01-15 RX ORDER — PROMETHAZINE HYDROCHLORIDE 25 MG/1
25 TABLET ORAL ONCE AS NEEDED
Status: DISCONTINUED | OUTPATIENT
Start: 2024-01-15 | End: 2024-01-15 | Stop reason: HOSPADM

## 2024-01-15 RX ORDER — HYDROCODONE BITARTRATE AND ACETAMINOPHEN 7.5; 325 MG/1; MG/1
1 TABLET ORAL EVERY 4 HOURS PRN
Qty: 30 TABLET | Refills: 0 | Status: SHIPPED | OUTPATIENT
Start: 2024-01-15

## 2024-01-15 RX ORDER — DROPERIDOL 2.5 MG/ML
INJECTION, SOLUTION INTRAMUSCULAR; INTRAVENOUS AS NEEDED
Status: DISCONTINUED | OUTPATIENT
Start: 2024-01-15 | End: 2024-01-15 | Stop reason: SURG

## 2024-01-15 RX ORDER — DROPERIDOL 2.5 MG/ML
0.62 INJECTION, SOLUTION INTRAMUSCULAR; INTRAVENOUS
Status: DISCONTINUED | OUTPATIENT
Start: 2024-01-15 | End: 2024-01-15 | Stop reason: HOSPADM

## 2024-01-15 RX ORDER — PROMETHAZINE HYDROCHLORIDE 25 MG/1
25 SUPPOSITORY RECTAL ONCE AS NEEDED
Status: DISCONTINUED | OUTPATIENT
Start: 2024-01-15 | End: 2024-01-15 | Stop reason: HOSPADM

## 2024-01-15 RX ORDER — SODIUM CHLORIDE, SODIUM LACTATE, POTASSIUM CHLORIDE, CALCIUM CHLORIDE 600; 310; 30; 20 MG/100ML; MG/100ML; MG/100ML; MG/100ML
9 INJECTION, SOLUTION INTRAVENOUS CONTINUOUS
Status: DISCONTINUED | OUTPATIENT
Start: 2024-01-15 | End: 2024-01-15 | Stop reason: HOSPADM

## 2024-01-15 RX ORDER — FENTANYL CITRATE 50 UG/ML
25 INJECTION, SOLUTION INTRAMUSCULAR; INTRAVENOUS ONCE AS NEEDED
Status: COMPLETED | OUTPATIENT
Start: 2024-01-15 | End: 2024-01-15

## 2024-01-15 RX ORDER — DIPHENHYDRAMINE HYDROCHLORIDE 50 MG/ML
12.5 INJECTION INTRAMUSCULAR; INTRAVENOUS
Status: DISCONTINUED | OUTPATIENT
Start: 2024-01-15 | End: 2024-01-15 | Stop reason: HOSPADM

## 2024-01-15 RX ORDER — OXYCODONE AND ACETAMINOPHEN 7.5; 325 MG/1; MG/1
1 TABLET ORAL EVERY 4 HOURS PRN
Status: DISCONTINUED | OUTPATIENT
Start: 2024-01-15 | End: 2024-01-15 | Stop reason: HOSPADM

## 2024-01-15 RX ORDER — CEPHALEXIN 500 MG/1
1000 CAPSULE ORAL 4 TIMES DAILY
Qty: 8 CAPSULE | Refills: 0 | Status: SHIPPED | OUTPATIENT
Start: 2024-01-15 | End: 2024-01-16

## 2024-01-15 RX ORDER — ONDANSETRON 4 MG/1
4 TABLET, FILM COATED ORAL DAILY PRN
Qty: 30 TABLET | Refills: 1 | Status: SHIPPED | OUTPATIENT
Start: 2024-01-15 | End: 2025-01-14

## 2024-01-15 RX ORDER — ROCURONIUM BROMIDE 10 MG/ML
INJECTION, SOLUTION INTRAVENOUS AS NEEDED
Status: DISCONTINUED | OUTPATIENT
Start: 2024-01-15 | End: 2024-01-15 | Stop reason: SURG

## 2024-01-15 RX ADMIN — SUGAMMADEX 200 MG: 100 INJECTION, SOLUTION INTRAVENOUS at 12:36

## 2024-01-15 RX ADMIN — DEXAMETHASONE SODIUM PHOSPHATE 4 MG: 4 INJECTION, SOLUTION INTRA-ARTICULAR; INTRALESIONAL; INTRAMUSCULAR; INTRAVENOUS; SOFT TISSUE at 11:08

## 2024-01-15 RX ADMIN — OXYCODONE AND ACETAMINOPHEN 1 TABLET: 7.5; 325 TABLET ORAL at 13:13

## 2024-01-15 RX ADMIN — ROCURONIUM BROMIDE 50 MG: 10 INJECTION, SOLUTION INTRAVENOUS at 11:45

## 2024-01-15 RX ADMIN — FENTANYL CITRATE 50 MCG: 50 INJECTION, SOLUTION INTRAMUSCULAR; INTRAVENOUS at 11:02

## 2024-01-15 RX ADMIN — MELOXICAM 15 MG: 15 TABLET ORAL at 08:06

## 2024-01-15 RX ADMIN — HYDROMORPHONE HYDROCHLORIDE 0.5 MG: 1 INJECTION, SOLUTION INTRAMUSCULAR; INTRAVENOUS; SUBCUTANEOUS at 12:49

## 2024-01-15 RX ADMIN — ROPIVACAINE HYDROCHLORIDE 15 ML: 5 INJECTION EPIDURAL; INFILTRATION; PERINEURAL at 11:08

## 2024-01-15 RX ADMIN — PROPOFOL 25 MCG/KG/MIN: 10 INJECTION, EMULSION INTRAVENOUS at 11:49

## 2024-01-15 RX ADMIN — DROPERIDOL 0.62 MG: 2.5 INJECTION, SOLUTION INTRAMUSCULAR; INTRAVENOUS at 12:23

## 2024-01-15 RX ADMIN — FENTANYL CITRATE 50 MCG: 50 INJECTION, SOLUTION INTRAMUSCULAR; INTRAVENOUS at 13:13

## 2024-01-15 RX ADMIN — SODIUM CHLORIDE, POTASSIUM CHLORIDE, SODIUM LACTATE AND CALCIUM CHLORIDE 9 ML/HR: 600; 310; 30; 20 INJECTION, SOLUTION INTRAVENOUS at 08:36

## 2024-01-15 RX ADMIN — MIDAZOLAM 2 MG: 1 INJECTION INTRAMUSCULAR; INTRAVENOUS at 11:02

## 2024-01-15 RX ADMIN — ACETAMINOPHEN 1000 MG: 500 TABLET ORAL at 08:38

## 2024-01-15 RX ADMIN — SODIUM CHLORIDE, POTASSIUM CHLORIDE, SODIUM LACTATE AND CALCIUM CHLORIDE: 600; 310; 30; 20 INJECTION, SOLUTION INTRAVENOUS at 12:24

## 2024-01-15 RX ADMIN — ONDANSETRON 4 MG: 2 INJECTION INTRAMUSCULAR; INTRAVENOUS at 11:51

## 2024-01-15 RX ADMIN — FENTANYL CITRATE 50 MCG: 50 INJECTION, SOLUTION INTRAMUSCULAR; INTRAVENOUS at 12:02

## 2024-01-15 RX ADMIN — CEFAZOLIN SODIUM 2000 MG: 2 INJECTION, SOLUTION INTRAVENOUS at 11:35

## 2024-01-15 RX ADMIN — DEXAMETHASONE SODIUM PHOSPHATE 6 MG: 4 INJECTION, SOLUTION INTRAMUSCULAR; INTRAVENOUS at 11:51

## 2024-01-15 RX ADMIN — LIDOCAINE HYDROCHLORIDE 80 MG: 20 INJECTION, SOLUTION EPIDURAL; INFILTRATION; INTRACAUDAL; PERINEURAL at 11:45

## 2024-01-15 RX ADMIN — FENTANYL CITRATE 50 MCG: 50 INJECTION, SOLUTION INTRAMUSCULAR; INTRAVENOUS at 12:03

## 2024-01-15 RX ADMIN — PROPOFOL 180 MG: 10 INJECTION, EMULSION INTRAVENOUS at 11:45

## 2024-01-15 NOTE — PLAN OF CARE
Goal Outcome Evaluation:  Plan of Care Reviewed With: patient, family              Patient is a 58 y.o. female POD0 Left Knee Revision with polyethylene exchange with expected post op weakness and impaired functional mobility- seen today in [facility]. Patient is ind at baseline and has rwx to use at dc. She lives in basement with 9 steps down and friend lives upstairs. Today, patient required SBA for transfers and ambulated 125ft using rwx requiring SBA. Pt completed 3 steps today with CGA and family member present for education as well during stair training. No LOB or unsteadiness noted. No knee buckling and pt reports she feels confident to complete steps at home. Based on current clinical presentation, patient okay to DC home today with assist and HHPT to follow up. No further acute PT needs.     Anticipated Discharge Disposition (PT): home with home health, home with assist

## 2024-01-15 NOTE — ANESTHESIA POSTPROCEDURE EVALUATION
Patient: Tea DANIELS    Procedure Summary       Date: 01/15/24 Room / Location:  [facility] [address] [place] [address] [facility]    Anesthesia Start: 1136 Anesthesia Stop: 1302    Procedure: LEFT TOTAL KNEE ARTHROPLASTY REVISION WITH POLYETHYLENE CHANGE (Left: Knee) Diagnosis:     Surgeons: Dayton Luis MD Provider: Leopoldo Carrizales MD    Anesthesia Type: general with block ASA Status: 2            Anesthesia Type: general with block    Vitals  Vitals Value Taken Time   /71 01/15/24 1400   Temp 36.6 °C (97.9 °F) 01/15/24 1355   Pulse 69 01/15/24 1406   Resp 18 01/15/24 1305   SpO2 98 % 01/15/24 1406   Vitals shown include unfiled device data.        Post Anesthesia Care and Evaluation    Patient location during evaluation: bedside  Patient participation: complete - patient participated  Level of consciousness: awake and alert  Pain management: adequate    Airway patency: patent  Anesthetic complications: No anesthetic complications  PONV Status: controlled  Cardiovascular status: blood pressure returned to baseline and acceptable  Respiratory status: acceptable  Hydration status: acceptable

## 2024-01-15 NOTE — DISCHARGE PLACEMENT REQUEST
"FRANCES Elda CAIN (58 y.o. Female)       Date of Birth   1965    Social Security Number       Address   [address] [address]    Home Phone   [phone]    MRN   [id number]       Denominational   Hol[employer]    Marital Status                               Admission Date   1/15/24    Admission Type   Elective    Admitting Provider   Dayton Luis MD    Attending Provider   Dayton Luis MD    Department, Room/Bed   [facility] OR, [facility] OR       Discharge Date       Discharge Disposition       Discharge Destination                                 Attending Provider: Dayton Luis MD    Allergies: Augmentin [Amoxicillin-pot Clavulanate], Depakote [Valproic Acid], Sulfa Antibiotics    Isolation: None   Infection: None   Code Status: Not on file    Ht: 154.9 cm (61\")   Wt: 81.2 kg (179 lb)    Admission Cmt: None   Principal Problem: None                  Active Insurance as of 1/15/2024       Primary Coverage       Payor Plan Insurance Group Employer/Plan Group    ANTH MEDICARE REPLACEMENT ANTH MEDICARE ADVANTAGE [id number]       Payor Plan Address Payor Plan Phone Number Payor Plan Fax Number Effective Dates    [address] [phone]  1/1/2024 - None Entered    [facility] [id number]         Subscriber Name Subscriber Birth Date Member ID       ELDA DANIELS 1965 [id number]                     Emergency Contacts        (Rel.) Home Phone Work Phone Mobile Phone    FRANCESLUCERO (Son) -- -- [phone]                "

## 2024-01-15 NOTE — ANESTHESIA PROCEDURE NOTES
Airway  Urgency: elective    Date/Time: 1/15/2024 11:48 AM  Airway not difficult    General Information and Staff    Patient location during procedure: OR  CRNA/CAA: Primo Ambrose CRNA    Indications and Patient Condition  Indications for airway management: airway protection    Preoxygenated: yes  Mask difficulty assessment: 1 - vent by mask    Final Airway Details  Final airway type: endotracheal airway      Successful airway: ETT  Cuffed: yes   Successful intubation technique: direct laryngoscopy  Facilitating devices/methods: intubating stylet  Endotracheal tube insertion site: oral  Blade: Roverto  Blade size: 3  ETT size (mm): 7.0  Cormack-Lehane Classification: grade I - full view of glottis  Placement verified by: chest auscultation and capnometry   Measured from: teeth  ETT/EBT  to teeth (cm): 21  Number of attempts at approach: 1  Assessment: lips, teeth, and gum same as pre-op and atraumatic intubation

## 2024-01-15 NOTE — ANESTHESIA PROCEDURE NOTES
Adductor canal block      Patient reassessed immediately prior to procedure    Patient location during procedure: pre-op  Start time: 1/15/2024 11:08 AM  Stop time: 1/15/2024 11:12 AM  Reason for block: at surgeon's request and post-op pain management  Performed by  Anesthesiologist: Sarah Jorge MD  Preanesthetic Checklist  Completed: patient identified, IV checked, site marked, risks and benefits discussed, surgical consent, monitors and equipment checked, pre-op evaluation and timeout performed  Prep:  Pt Position: sitting  Sterile barriers:cap, gloves and mask  Prep: ChloraPrep  Patient monitoring: blood pressure monitoring, continuous pulse oximetry and EKG  Procedure    Sedation: yes  Performed under: local infiltration  Guidance:ultrasound guided    ULTRASOUND INTERPRETATION.  Using ultrasound guidance a 21 G gauge needle was placed in close proximity to the nerve, at which point, under ultrasound guidance anesthetic was injected in the area of the nerve and spread of the anesthesia was seen on ultrasound in close proximity thereto.  There were no abnormalities seen on ultrasound; a digital image was taken; and the patient tolerated the procedure with no complications. Images:still images obtained, printed/placed on chart    Laterality:left  Block Type:adductor canal block  Injection Technique:single-shot  Needle Type:short-bevel and echogenic  Needle Gauge:21 G  Resistance on Injection: none    Medications Used: ropivacaine (NAROPIN) 0.5 % injection - Injection   15 mL - 1/15/2024 11:08:00 AM  dexamethasone (DECADRON) injection - Injection   4 mg - 1/15/2024 11:08:00 AM      Medications  Comment:Ultrasound Interpretation:  Using ultrasound guidance the needle was placed in close proximity to the target nerve and anesthetic was injected in the area of the target nerve and/or bundles, and spread of the anesthetic was seen on ultrasound in close proximity thereto.  There were no abnormalities seen on  ultrasound; a digital image was taken; and the patient tolerated the procedure with no complications.    Block placed for postoperative pain control per surgeon request.       Post Assessment  Injection Assessment: negative aspiration for heme, no paresthesia on injection and incremental injection  Patient Tolerance:comfortable throughout block  Complications:no

## 2024-01-15 NOTE — CASE MANAGEMENT/SOCIAL WORK
Continued Stay Note  [facility]     Patient Name: Tea DANIELS  MRN: [id number]  Today's Date: 1/15/2024    Admit Date: 1/15/2024    [facility]   Discharge Plan       Row Name 01/15/24 1141       Plan    Plan Home with [facility]    Patient/Family in Agreement with Plan yes    Provided Post Acute Provider List? Yes    Post Acute Provider List Home Health    Delivered To Patient    Method of Delivery Telephone                   Discharge Codes    No documentation.                       Shannon Epley, RN

## 2024-01-15 NOTE — H&P
ORTHOPEDIC SURGERY PRE-OP HISTORY AND PHYSICAL      Patient: Tea DANIELS  Date of Admission: 1/15/2024  7:13 AM  YOB: 1965  Medical Record Number: [id number]  Attending Physician: Dayton Luis MD  Consulting Physician: Dayton Luis MD    CHIEF COMPLAINT: Left Knee Pain.    HISTORY OF PRESENT ILLNESS: Patient is a 58 y.o. female presents to [facility] with above complaints.  The patient has history of Left TKA arthroplasty in 2020.  She was kneeling and felt a pop and has been having mechanical popping ever since.  X-rays obtained in the office revealed a dissociated polyethylene component.  The patient failed conservative treatment and patient requested surgical intervention.  The patient presents for a  Left total knee revision.    ALLERGIES:   Allergies   Allergen Reactions    Augmentin [Amoxicillin-Pot Clavulanate] Nausea And Vomiting    Depakote [Valproic Acid] Other (See Comments)     Tremors, discontinue with discontinuing use of Depakote    Sulfa Antibiotics Nausea And Vomiting       HOME MEDICATIONS:  Medications Prior to Admission   Medication Sig Dispense Refill Last Dose    fluticasone (FLONASE) 50 MCG/ACT nasal spray INSTRILL 2 SPRAY INTO THE NOSTRIL AS DIRECTED 48 g 3 1/14/2024    hydrOXYzine (ATARAX) 25 MG tablet Take 1 tablet by mouth Every 6 (Six) Hours As Needed for Anxiety.       lamoTRIgine (LaMICtal) 200 MG tablet Take 1 tablet by mouth Daily.   1/15/2024    levothyroxine (SYNTHROID, LEVOTHROID) 100 MCG tablet Take 1 tablet by mouth Daily. 90 tablet 2 1/15/2024    losartan (COZAAR) 25 MG tablet TAKE 1 TABLET BY MOUTH DAILY FOR BLOOD PRESSURE 30 tablet 5 1/14/2024    prazosin (MINIPRESS) 5 MG capsule Take 1 mg by mouth 2 (Two) Times a Day.   1/14/2024    valACYclovir (Valtrex) 500 MG tablet Take 1 tablet by mouth Daily. 90 tablet 3        Past Medical History:   Diagnosis Date    Abnormal EKG     ADHD (attention deficit hyperactivity  disorder)     Allergic     Seasonal    Anemia     Anorexia     Anxiety     Arthritis     Bipolar disorder     Chest pain     Depression     Diarrhea     Dysphagia     Encounter for screening mammogram for breast cancer 08/17/2016    Fatigue     Fibrocystic breast     Glaucoma     Health care maintenance     Hypercalcemia     Hypertension 12/2022    Hypothyroidism     2010 ?    Nausea & vomiting     Obesity     Parathyroid adenoma     PONV (postoperative nausea and vomiting)     Rash     Thyroid dysfunction     thyroid nodules now    Tremor 08/17/2016    Secondary to Depakote, resolved with discontinuation seen neurology     Past Surgical History:   Procedure Laterality Date    APPENDECTOMY      COLONOSCOPY N/A 06/09/2016    Procedure: COLONOSCOPY into cecum and terminal ileum with biopsies;  Surgeon: Godwin Dee MD;  Location: [facility] ENDOSCOPY;  Service:     ENDOSCOPY N/A 06/09/2016    Procedure: ESOPHAGOGASTRODUODENOSCOPY with biopsies;  Surgeon: Godwin Dee MD;  Location: [facility] ENDOSCOPY;  Service:     HYSTERECTOMY      JOINT REPLACEMENT Bilateral 05/2020    3 MONTHS APART    KNEE ALLOGRAFT CARTILAGE IMPLANTATION  05/2010    SUBTOTAL HYSTERECTOMY  06/2006    THYROIDECTOMY, PARTIAL      right side    TONSILLECTOMY      TUBAL ABDOMINAL LIGATION  1994     Social History     Occupational History    Occupation: disabled   Tobacco Use    Smoking status: Never    Smokeless tobacco: Never   Substance and Sexual Activity    Alcohol use: Not Currently     Comment: One glass of wine or mixed drink maybe once every few months    Drug use: Never    Sexual activity: Yes     Partners: Male     Birth control/protection: Surgical, Tubal ligation     Comment: Currently in menopause      Social History     Social History Narrative    Not on file     Family History   Problem Relation Age of Onset    Hypertension Mother     Heart disease Mother         Also has had a heart attack    Heart attack Mother     Breast cancer  Mother     Thyroid disease Mother     Depression Mother     Alcohol abuse Mother     Macular degeneration Mother     Cancer Mother     Stroke Mother     Vision loss Mother         Glaucoma and macular degeneration    Alcohol abuse Father     Aortic aneurysm Father     Alcohol abuse Brother     Colon cancer Maternal Grandfather     Malig Hyperthermia Neg Hx        REVIEW OF SYSTEMS:    HEENT: Patient denies any headaches, vision changes, change in hearing, or tinnitus, Patient denies epistaxis, sinus pain, hoarseness, or dysphagia   Pulmonary: Patient denies any cough, congestion, acute change in SOA or wheezing.   Cardiovascular: Patient denies any change in chest pain, dyspnea, palpitations, weakness, intolerance of exercise, varicosities, change in murmur   Gastrointestinal:  Patient denies change in appetite, melena, change in bowel habits.   Genital/Urinary: Patient denies dysuria, change in color of urine, change in frequency of urination, pain with urgency, change in incontinence, retention.   Musculoskeletal: Patient denies complaints of acute changes in symptoms of other joints not mentioned above.   Neurological: Patient denies changes in dizziness, tremor, ataxia, or difficulty in speaking or changes in memory.   Endocrine system: Patient denies acute changes in tremors, palpitations, polyuria, polydipsia, polyphagia, diaphoresis, exophthalmos, or goiter.   Psychological: Patient denies thoughts/plans of harming self or other; denies acute changes in depression,  insomnia, night terrors, linda, disorientation.   Skin: Patient denies any bruising, rashes, discoloration, pruritus,or wounds not mentioned in history of present illness or chief complaint above.   Hematopoietic: Patient denies current bleeding, epistaxis, hematuria, or melena.    PHYSICAL EXAM:   Vitals:  Vitals:    01/12/24 1210 01/15/24 0730 01/15/24 1055   BP:  138/77 140/76   BP Location:  Left arm Left arm   Patient Position:  Sitting  "Sitting   Pulse:  80 80   Resp:  18 18   Temp:  98.2 °F (36.8 °C)    TempSrc:  Oral    SpO2:  100% 98%   Weight: 81.2 kg (179 lb)     Height: 154.9 cm (61\")         General:  58 y.o. female who appears about stated age.    Alert, cooperative, in no acute distress                       Head:    Normocephalic, without obvious abnormality, atraumatic   Eyes:            Lids and lashes normal, conjunctivae and sclerae normal, no         icterus, no pallor, corneas clear, PERRLA   Ears:    Ears appear intact with no abnormalities noted   Throat:   No oral lesions, no thrush, oral mucosa moist   Neck:   No adenopathy, supple, trachea midline, no JVD   Back:     Limited exam shows no severe kyphosis present,no visible           erythema, no excessive  tenderness to palpation.    Lungs:     Respirations regular, even and unlabored.     Heart:    Normal rate, Pulses palpable   Chest Wall:    No abnormalities observed.   Abdomen:     Normal bowel sounds, no masses, no organomegaly, soft              non-tender, non-distended, no guarding, no rebound                      tenderness   Rectal:     Deferred   Pulses:   Pulses palpable and equal bilaterally   Skin:   No bleeding, bruising or rash   Lymph nodes:   No palpable adenopathy   Extremities:     Left Knee: Skin intact.  Painful ROM.  NVI distally.      DIAGNOSTIC TEST:  No visits with results within 2 Day(s) from this visit.   Latest known visit with results is:   Orders Only on 06/02/2023   Component Date Value Ref Range Status    TSH 06/02/2023 0.723  0.270 - 4.200 uIU/mL Final    Total Cholesterol 06/02/2023 234 (H)  0 - 200 mg/dL Final    Comment: Cholesterol Reference Ranges  (U.S. Department of Health and Human Services ATP III  Classifications)  Desirable          <200 mg/dL  Borderline High    200-239 mg/dL  High Risk          >240 mg/dL  Triglyceride Reference Ranges  (U.S. Department of Health and Human Services ATP III  Classifications)  Normal           <150 " mg/dL  Borderline High  150-199 mg/dL  High             200-499 mg/dL  Very High        >500 mg/dL  HDL Reference Ranges  (U.S. Department of Health and Human Services ATP III  Classifications)  Low     <40 mg/dl (major risk factor for CHD)  High    >60 mg/dl ('negative' risk factor for CHD)  LDL Reference Ranges  (U.S. Department of Health and Human Services ATP III  Classifications)  Optimal          <100 mg/dL  Near Optimal     100-129 mg/dL  Borderline High  130-159 mg/dL  High             160-189 mg/dL  Very High        >189 mg/dL      Triglycerides 06/02/2023 57  0 - 150 mg/dL Final    HDL Cholesterol 06/02/2023 114 (H)  40 - 60 mg/dL Final    VLDL Cholesterol Hiram 06/02/2023 10  5 - 40 mg/dL Final    LDL Chol Calc (NIH) 06/02/2023 110 (H)  0 - 100 mg/dL Final    LDL/HDL RATIO 06/02/2023 0.95   Final    Glucose 06/02/2023 86  65 - 99 mg/dL Final    BUN 06/02/2023 13  6 - 20 mg/dL Final    Creatinine 06/02/2023 0.89  0.57 - 1.00 mg/dL Final    EGFR Result 06/02/2023 76.7  >60.0 mL/min/1.73 Final    Comment: GFR Normal >60  Chronic Kidney Disease <60  Kidney Failure <15      BUN/Creatinine Ratio 06/02/2023 14.6  7.0 - 25.0 Final    Sodium 06/02/2023 145  136 - 145 mmol/L Final    Potassium 06/02/2023 4.2  3.5 - 5.2 mmol/L Final    Chloride 06/02/2023 108 (H)  98 - 107 mmol/L Final    Total CO2 06/02/2023 26.9  22.0 - 29.0 mmol/L Final    Calcium 06/02/2023 9.8  8.6 - 10.5 mg/dL Final    Total Protein 06/02/2023 6.4  6.0 - 8.5 g/dL Final    Albumin 06/02/2023 4.5  3.5 - 5.2 g/dL Final    Globulin 06/02/2023 1.9  gm/dL Final    A/G Ratio 06/02/2023 2.4  g/dL Final    Total Bilirubin 06/02/2023 0.4  0.0 - 1.2 mg/dL Final    Alkaline Phosphatase 06/02/2023 119 (H)  39 - 117 U/L Final    AST (SGOT) 06/02/2023 33 (H)  1 - 32 U/L Final    ALT (SGPT) 06/02/2023 40 (H)  1 - 33 U/L Final    WBC 06/02/2023 6.15  3.40 - 10.80 10*3/mm3 Final    RBC 06/02/2023 3.83  3.77 - 5.28 10*6/mm3 Final    Hemoglobin 06/02/2023 12.4   12.0 - 15.9 g/dL Final    Hematocrit 06/02/2023 36.8  34.0 - 46.6 % Final    MCV 06/02/2023 96.1  79.0 - 97.0 fL Final    MCH 06/02/2023 32.4  26.6 - 33.0 pg Final    MCHC 06/02/2023 33.7  31.5 - 35.7 g/dL Final    RDW 06/02/2023 13.1  12.3 - 15.4 % Final    Platelets 06/02/2023 249  140 - 450 10*3/mm3 Final    Neutrophil Rel % 06/02/2023 48.7  42.7 - 76.0 % Final    Lymphocyte Rel % 06/02/2023 39.0  19.6 - 45.3 % Final    Monocyte Rel % 06/02/2023 9.3  5.0 - 12.0 % Final    Eosinophil Rel % 06/02/2023 2.1  0.3 - 6.2 % Final    Basophil Rel % 06/02/2023 0.7  0.0 - 1.5 % Final    Neutrophils Absolute 06/02/2023 3.00  1.70 - 7.00 10*3/mm3 Final    Lymphocytes Absolute 06/02/2023 2.40  0.70 - 3.10 10*3/mm3 Final    Monocytes Absolute 06/02/2023 0.57  0.10 - 0.90 10*3/mm3 Final    Eosinophils Absolute 06/02/2023 0.13  0.00 - 0.40 10*3/mm3 Final    Basophils Absolute 06/02/2023 0.04  0.00 - 0.20 10*3/mm3 Final    Immature Granulocyte Rel % 06/02/2023 0.2  0.0 - 0.5 % Final    Immature Grans Absolute 06/02/2023 0.01  0.00 - 0.05 10*3/mm3 Final    nRBC 06/02/2023 0.0  0.0 - 0.2 /100 WBC Final    Unable to Void 06/02/2023 Comment   Final    Comment: The patient was not able to render a urine sample and has been  instructed to return for a urine collection at their earliest  convenience.  The urine testing that you have requested has  been deleted from this report.  When the patient returns and  provides a urine specimen, the urine testing will be performed  and separately reported.         No results found.      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    * No active hospital problems. *      PLAN:    Left TKA revision, with likely polyethylene exchange. Plan for d/c home today if only poly change.  Admit post-op if complete TKA revision.    Risks and benefits of surgical intervention were discussed in detail with the patient.  Risks of infection, fracture, dislocation, extremity length  discrepancy, neurovascular injury, persistent pain, medical risks, anesthetic risk, need for additional surgery, deep venous thrombosis, pulmonary embolism and death.      The above diagnosis and treatment plan was discussed with the patient and/or family.  They were educated in both non-surgical and surgical treatment options for their condition.   They were given the opportunity to ask questions and were answered to their satisfaction.  They agreed to proceed with the above treatment plan.        Dayton Luis MD  Date: 1/15/2024

## 2024-01-15 NOTE — OP NOTE
TOTAL KNEE ARTHROPLASTY REVISION  Procedure Note    Tea DANIELS  1/15/2024    Pre-op Diagnosis: Unstable Left Total knee, with possible dissociated polyethylene component.  Post-op Diagnosis:Unstable left Total knee arthroplasty  Procedure:  Left Knee Revision with polyethylene exchange  Surgical Approach: Knee Medial Parapatellar   Surgeon:  Dayton Luis MD  Anesthesia: General with Block, Anesthesiologist: Leopoldo Carrizales MD  CRNA: Primo Ambrose CRNA  Staff: Circulator: Elena Kelly RN; Monique Baum RN  Scrub Person: Reza Corbin  Vendor Representative: Dg Goff  Assistant: David Horvath PA-C CFA  Estimated Blood Loss:100 mL  Specimens:* No orders in the log *   Drains: one  Complications: None    Components Utilized:     Implant Name Type Inv. Item Serial No.  Lot No. LRB No. Used Action   DEV CONTRL TISS STRATAFIX SPIRAL MNCRYL UD 3/0 PLS 30CM - [id number] Implant DEV CONTRL TISS STRATAFIX SPIRAL MNCRYL UD 3/0 PLS 30CM  [employer]  DIV OF J AND J NR Left 1 Implanted   DEV CONTRL TISS STRATAFIX SYMM PDS PLUS HODAN CT-1 45CM - [id number] Implant DEV CONTRL TISS STRATAFIX SYMM PDS PLUS HODAN CT-1 45CM  [employer]  DIV OF J AND J NR Left 1 Implanted   DEV CONTRL TISS STRATAFIX SYMM PDS PLUS HODAN CT-1 45CM - [id number] Implant DEV CONTRL TISS STRATAFIX SYMM PDS PLUS HODAN CT-1 45CM  [employer]  DIV OF J AND J NR Left 1 Implanted   VIVACIT-E HIGHLY CROSSLINKED POLYETHYLENE     [id number] Left 1 Implanted       Indication for Procedure:   The patient is a 58 y.o. female presents today with a history of Left total knee arthroplasty that developed instability.  Recommendations for the above procedure were made to improve her instability.  The patient was educated in risks of surgery that could include possible infection, deep venous thrombosis, pulmonary embolism, fracture, neurovascular injury, leg length discrepancy, dislocation, possible persistent pain, need for additional  surgeries including possible removal of knee replacement and placement of antibiotic spacer, anesthetic risks, medical risks including heart attack and stroke, and death.  The discussion occurred on the floor pre-operatively, and patient had the opportunity to ask questions, and concerns about the proposed surgery.  The patient also understood that medicine is not an exact science, and that outcomes of the surgical procedure may be less than desired. The patient wished to proceed.      Protocols for intravenous antibiotics and venous thrombosis were followed for this patient.  IV antibiotics were infused prior to surgery and will be discontinued within 24 hours of completion of the surgical procedure.  Thrombosis prophylaxis will be initiated within 24 hours of the completion of the surgical procedure.      Procedure:   After the patient was identified in the preoperative area, and the surgical site confirmed and marked, the patient was brought to the operating room on a stretcher.  They were placed supine on the operating room table and the above anesthetic was placed uneventfully.  A time-out procedure was performed.  The intravenous ancef infusion was completed.  A non-sterile tourniquet was placed on the operative thigh, and was prepped and draped in the usual sterile fashion.  An Esmarch was to exsanguinate the limb, and the tourniquet was inflated.     A 10 blade scalpel was used to make a longitudinal incision over the old incision line.  A medial alison-patellar arthrotomy was performed with another 10 blade scalpel. The articular fluid was normal in appearance.   The medial joint line was elevated subperiosteally with electrocautery and a cline elevator.  The infra-patellar fat pad and scar tissue was removed.  The patella was everted and the patella was checked and found to be stable.  The femoral and tibial components were also checked to be stable to the underlying bone.  The polyethylene component was in  the correct place and was stable to the tibial implant. There was no metallosis in the knee and not signs that the polyethylene component had dissociated.  The 10 mm polyethylene component was removed and the above 12 mm trial was placed and the knee was checked for stability.  The new polyethylene component sized 12mm was inserted and locked into place.  The knee was ranged and found to be stable in all planes. The arthrotomy was closed with #1 Stata-Fix suture, #1 strata-fix was used to close the deep dermis and 3-0 strata-fix followed by dermabond skin glue was used to close the skin.  Sterile dressings were applied.  The patient was awakened from anesthesia and returned recovery in stable condition. There were no known intraoperative complications.  Sponge and needle counts were completed and were correct.     Dayton Luis MD     Date: 1/15/2024  Time: 12:28 EST

## 2024-01-15 NOTE — ANESTHESIA PREPROCEDURE EVALUATION
Anesthesia Evaluation     Patient summary reviewed and Nursing notes reviewed   history of anesthetic complications:  PONV  NPO Solid Status: > 6 hours  NPO Liquid Status: > 2 hours           Airway   Mallampati: II  TM distance: >3 FB  Neck ROM: full  Dental - normal exam   (+) partials    Pulmonary    (-) COPD, asthma, not a smoker  Cardiovascular   Exercise tolerance: good (4-7 METS)    ECG reviewed    (+) hypertension, hyperlipidemia  (-) past MI, dysrhythmias, angina    ROS comment: 1/2024 Stress test normal    1/2024 TTE:    Summary:                 Left ventricle size is normal.                            Overall left ventricular function is normal.                            The ejection fraction biplane was calculated at 57%.                            Mitral valve tissue Doppler E/E'' ratio consistent with elevated left atrial pressures.                            Structurally normal mitral valve.                            Trace mitral regurgitation is present.                            Tricuspid valve is structurally normal.                            Trace tricuspid regurgitation.                            Right ventricular systolic pressure consistent with no pulmonary hypertension.                            Right ventricular systolic pressure of 28 mmHg.                            Trace pulmonic insufficiency present.   Findings   Left Ventricle:           The ejection fraction biplane was calculated at 57%.                             Left ventricle size is normal.                             Overall left ventricular function is normal.                             Mitral valve tissue Doppler E/E'' ratio consistent with elevated left atrial pressures.   Left Atrium:              The left atrial chamber size appears normal.   Right Ventricle:          The right ventricular chamber size and systolic function are within normal limits.   Right Atrium:             The right atrial chamber size appears  normal.   Aortic Valve:             The aortic valve appears normal in structure and function. There is no evidence of aortic regurgitation.   Mitral Valve:             Structurally normal mitral valve.                             Trace mitral regurgitation is present.   Tricuspid Valve:          Right ventricular systolic pressure of 28 mmHg.                             Tricuspid valve is structurally normal.                             Trace tricuspid regurgitation.                             Right ventricular systolic pressure consistent with no pulmonary hypertension.   Pulmonic Valve:           Trace pulmonic insufficiency present.   Pericardium:              No evidence of pericardial effusion.   Aorta/Great Vessels: The aortic root appears normal.                        There is no dilatation of the ascending aorta.       Neuro/Psych  (+) psychiatric history Anxiety, Depression, Bipolar and ADHD  (-) seizures, CVA  GI/Hepatic/Renal/Endo    (+) obesity, thyroid problem hypothyroidism  (-) GERD, liver disease, no renal disease, diabetes    Musculoskeletal     Abdominal    Substance History      OB/GYN          Other                    Anesthesia Plan    ASA 2     general with block       Anesthetic plan, risks, benefits, and alternatives have been provided, discussed and informed consent has been obtained with: patient.    CODE STATUS:

## 2024-01-15 NOTE — THERAPY EVALUATION
Patient Name: Tea DANIELS  : 1965    MRN: [id number]                              Today's Date: 1/15/2024       Admit Date: 1/15/2024    Visit Dx:     ICD-10-CM ICD-9-CM   1. Failed total knee arthroplasty, initial encounter  T84.018A 996.47    Z96.659 V43.65     Patient Active Problem List   Diagnosis    Colitis, Clostridium difficile    Dysphagia    Flatulence    Irritable bowel syndrome with diarrhea    Fatigue    Alopecia    Hypothyroidism    Hypercalcemia    Encounter for screening mammogram for breast cancer    High risk medication use    Macrocytosis without anemia    Bipolar disorder    S/P parathyroidectomy    Hyperlipidemia    Acute pain of right knee    Patellar tendinitis of right knee    Atypical chest pain    Essential hypertension    Parasites in stool     Past Medical History:   Diagnosis Date    Abnormal EKG     ADHD (attention deficit hyperactivity disorder)     Allergic     Seasonal    Anemia     Anorexia     Anxiety     Arthritis     Bipolar disorder     Chest pain     Depression     Diarrhea     Dysphagia     Encounter for screening mammogram for breast cancer 2016    Fatigue     Fibrocystic breast     Glaucoma     Health care maintenance     Hypercalcemia     Hypertension 2022    Hypothyroidism      ?    Nausea & vomiting     Obesity     Parathyroid adenoma     PONV (postoperative nausea and vomiting)     Rash     Thyroid dysfunction     thyroid nodules now    Tremor 2016    Secondary to Depakote, resolved with discontinuation seen neurology     Past Surgical History:   Procedure Laterality Date    APPENDECTOMY      COLONOSCOPY N/A 2016    Procedure: COLONOSCOPY into cecum and terminal ileum with biopsies;  Surgeon: Godwin Dee MD;  Location: [facility] ENDOSCOPY;  Service:     ENDOSCOPY N/A 2016    Procedure: ESOPHAGOGASTRODUODENOSCOPY with biopsies;  Surgeon: Godwin Dee MD;  Location: [facility] ENDOSCOPY;  Service:     HYSTERECTOMY      JOINT  REPLACEMENT Bilateral 05/2020    3 MONTHS APART    KNEE ALLOGRAFT CARTILAGE IMPLANTATION  05/2010    SUBTOTAL HYSTERECTOMY  06/2006    THYROIDECTOMY, PARTIAL      right side    TONSILLECTOMY      TUBAL ABDOMINAL LIGATION  1994      General Information       Row Name 01/15/24 1511          Physical Therapy Time and Intention    Document Type discharge evaluation/summary  -CB     Mode of Treatment individual therapy;physical therapy  -CB       Row Name 01/15/24 1511          General Information    Patient Profile Reviewed yes  -CB     Prior Level of Function independent:;gait;transfer;bed mobility;ADL's  -CB     Existing Precautions/Restrictions fall  -CB     Barriers to Rehab none identified  -CB       Row Name 01/15/24 1511          Living Environment    People in Home friend(s)  pt lives in basement and friend lives upstairs  -CB       Row Name 01/15/24 1511          Home Main Entrance    Number of Stairs, Main Entrance --  9 steps down to the basement where she lives; no handrails; pt will have cane and assist from family member who was in room on eval; education provided and family member watched stair training today  -CB       Row Name 01/15/24 1511          Cognition    Orientation Status (Cognition) oriented x 4  -CB       Row Name 01/15/24 1511          Safety Issues, Functional Mobility    Impairments Affecting Function (Mobility) range of motion (ROM);strength;endurance/activity tolerance;pain  -CB               User Key  (r) = Recorded By, (t) = Taken By, (c) = Cosigned By      Initials Name Provider Type    Liz Garcia, PT Physical Therapist                   Mobility       Row Name 01/15/24 1512          Bed Mobility    Comment, (Bed Mobility) UIC  -CB       Row Name 01/15/24 1512          Sit-Stand Transfer    Sit-Stand [place] (Transfers) standby assist;verbal cues  -CB     Assistive Device (Sit-Stand Transfers) walker, front-wheeled  -CB       Row Name 01/15/24 1512          Gait/Stairs  (Locomotion)    [place] Level (Gait) standby assist;verbal cues  -CB     Assistive Device (Gait) walker, front-wheeled  -CB     Distance in Feet (Gait) 125ft  -CB     Deviations/Abnormal Patterns (Gait) gait speed decreased;stride length decreased  -CB     Handrail Location (Stairs) --  B handrails when ascending and L side handrail (to mimic SPC) and PT provided HHA on LUE during descending; family member educated where to stand and how to assist pt  -CB     Number of Steps (Stairs) 3  -CB     Ascending Technique (Stairs) step-to-step  -CB     Descending Technique (Stairs) step-to-step  -CB     Comment, (Gait/Stairs) no LOB; no knee buckling noted  -CB       Row Name 01/15/24 1512          Mobility    Extremity Weight-bearing Status left lower extremity  -CB     Left Lower Extremity (Weight-bearing Status) weight-bearing as tolerated (WBAT)  -CB               User Key  (r) = Recorded By, (t) = Taken By, (c) = Cosigned By      Initials Name Provider Type    Liz Garcia, PT Physical Therapist                   Obj/Interventions       Row Name 01/15/24 1513          Range of Motion Comprehensive    Comment, General Range of Motion L  knee approx 0-90 degrees  -CB       Row Name 01/15/24 1513          Strength Comprehensive (MMT)    Comment, General Manual Muscle Testing (MMT) Assessment post op weakness  -CB       Row Name 01/15/24 1513          Motor Skills    Therapeutic Exercise --  TKA protocol x10 reps  -CB       Row Name 01/15/24 1513          Balance    Balance Assessment standing static balance;standing dynamic balance  -CB     Static Standing Balance standby assist;verbal cues  -CB     Dynamic Standing Balance standby assist;contact guard;verbal cues  -CB     Position/Device Used, Standing Balance supported;walker, front-wheeled  -CB       Row Name 01/15/24 1513          Sensory Assessment (Somatosensory)    Sensory Assessment (Somatosensory) sensation intact  -CB               User Key  (r) =  Recorded By, (t) = Taken By, (c) = Cosigned By      Initials Name Provider Type    CB Liz Price, PT Physical Therapist                   Goals/Plan    No documentation.                  Clinical Impression       Row Name 01/15/24 1514          Pain    Pretreatment Pain Rating 2/10  -CB     Posttreatment Pain Rating 2/10  -CB     Pain Location - Side/Orientation Left  -CB     Pain Location incisional  -CB     Pain Location - knee  -CB     Pain Intervention(s) Repositioned;Rest;Cold pack;Ambulation/increased activity  -CB       Row Name 01/15/24 1514          Plan of Care Review    Plan of Care Reviewed With patient;family  -CB     Outcome Evaluation Patient is a 58 y.o. female POD0 Left Knee Revision with polyethylene exchange with expected post op weakness and impaired functional mobility- seen today in OSC. Patient is ind at baseline and has rwx to use at SD. She lives in basement with 9 steps down and friend lives upstairs. Today, patient required SBA for transfers and ambulated 125ft using rwx requiring SBA. Pt completed 3 steps today with CGA and family member present for education as well during stair training. No LOB or unsteadiness noted. No knee buckling and pt reports she feels confident to complete steps at home. Based on current clinical presentation, patient okay to DC home today with assist and HHPT to follow up. No further acute PT needs.  -CB       Row Name 01/15/24 1514          Therapy Assessment/Plan (PT)    Therapy Frequency (PT) evaluation only  -CB       Row Name 01/15/24 1514          Positioning and Restraints    Pre-Treatment Position sitting in chair/recliner  -CB     Post Treatment Position chair  -CB     In Chair notified nsg;reclined;call light within reach;encouraged to call for assist;with family/caregiver  -CB               User Key  (r) = Recorded By, (t) = Taken By, (c) = Cosigned By      Initials Name Provider Type    CB Liz Price, PT Physical Therapist                    Outcome Measures       Row Name 01/15/24 1514          How much help from another person do you currently need...    Turning from your back to your side while in flat bed without using bedrails? 4  -CB     Moving from lying on back to sitting on the side of a flat bed without bedrails? 4  -CB     Moving to and from a bed to a chair (including a wheelchair)? 3  -CB     Standing up from a chair using your arms (e.g., wheelchair, bedside chair)? 3  -CB     Climbing 3-5 steps with a railing? 3  -CB     To walk in hospital room? 3  -CB     AM-PAC 6 Clicks Score (PT) 20  -CB     Highest Level of Mobility Goal 6 --> Walk 10 steps or more  -CB       Row Name 01/15/24 1514          Functional Assessment    Outcome Measure Options AM-PAC 6 Clicks Basic Mobility (PT)  -CB               User Key  (r) = Recorded By, (t) = Taken By, (c) = Cosigned By      Initials Name Provider Type    CB Liz Price, PT Physical Therapist                                 Physical Therapy Education       Title: PT OT SLP Therapies (Done)       Topic: Physical Therapy (Done)       Point: Mobility training (Done)       Learning Progress Summary             Patient Acceptance, E,TB, VU by CB at 1/15/2024 1514                         Point: Home exercise program (Done)       Learning Progress Summary             Patient Acceptance, E,TB, VU by CB at 1/15/2024 1514                         Point: Body mechanics (Done)       Learning Progress Summary             Patient Acceptance, E,TB, VU by CB at 1/15/2024 1514                         Point: Precautions (Done)       Learning Progress Summary             Patient Acceptance, E,TB, VU by CB at 1/15/2024 1514                                         User Key       Initials Effective Dates Name Provider Type Discipline     10/22/21 -  Liz Price, ARTHUR Physical Therapist PT                  PT Recommendation and Plan     Plan of Care Reviewed With: patient, family  Outcome Evaluation: Patient is a 58  y.o. female POD0 Left Knee Revision with polyethylene exchange with expected post op weakness and impaired functional mobility- seen today in OSC. Patient is ind at baseline and has rwx to use at dc. She lives in basement with 9 steps down and friend lives upstairs. Today, patient required SBA for transfers and ambulated 125ft using rwx requiring SBA. Pt completed 3 steps today with CGA and family member present for education as well during stair training. No LOB or unsteadiness noted. No knee buckling and pt reports she feels confident to complete steps at home. Based on current clinical presentation, patient okay to DC home today with assist and HHPT to follow up. No further acute PT needs.     Time Calculation:         PT Charges       Row Name 01/15/24 1516             Time Calculation    Start Time 1452  -CB      Stop Time 1506  -CB      Time Calculation (min) 14 min  -CB      PT Received On 01/15/24  -CB         Time Calculation- PT    Total Timed Code Minutes- PT 8 minute(s)  -CB         Timed Charges    47961 - PT Therapeutic Exercise Minutes 8  -CB         Total Minutes    Timed Charges Total Minutes 8  -CB       Total Minutes 8  -CB                User Key  (r) = Recorded By, (t) = Taken By, (c) = Cosigned By      Initials Name Provider Type    CB Liz Price, PT Physical Therapist                  Therapy Charges for Today       Code Description Service Date Service Provider Modifiers Qty    14315175400 HC PT THER PROC EA 15 MIN 1/15/2024 Liz Price, PT GP 1    32433702744 HC PT EVAL LOW COMPLEXITY 2 1/15/2024 Liz Price, PT GP 1            PT G-Codes  Outcome Measure Options: AM-PAC 6 Clicks Basic Mobility (PT)  AM-PAC 6 Clicks Score (PT): 20  PT Discharge Summary  Anticipated Discharge Disposition (PT): home with home health, home with assist    Liz Price PT  1/15/2024

## 2024-01-16 NOTE — DISCHARGE SUMMARY
Discharge Summary    Date of Admission: 1/15/2024  7:13 AM  Date of Discharge:  1/16/2024    Discharge Diagnosis:   Unstable TKA      PMHX:   Past Medical History:   Diagnosis Date    Abnormal EKG     ADHD (attention deficit hyperactivity disorder)     Allergic     Seasonal    Anemia     Anorexia     Anxiety     Arthritis     Bipolar disorder     Chest pain     Depression     Diarrhea     Dysphagia     Encounter for screening mammogram for breast cancer 08/17/2016    Fatigue     Fibrocystic breast     Glaucoma     Health care maintenance     Hypercalcemia     Hypertension 12/2022    Hypothyroidism     2010 ?    Nausea & vomiting     Obesity     Parathyroid adenoma     PONV (postoperative nausea and vomiting)     Rash     Thyroid dysfunction     thyroid nodules now    Tremor 08/17/2016    Secondary to Depakote, resolved with discontinuation seen neurology       [place] [facility] [place] Performed  Procedure(s):  LEFT TOTAL KNEE ARTHROPLASTY REVISION WITH POLYETHYLENE CHANGE       Indication for Admission  Patient is a 58 y.o. female underwent the above surgical procedure. They were evaluated and discharged home as outpatient procedure after being cleared by PT.  They were deemed stable for discharge.      Consults:   Consults       No orders found for last 30 day(s).            Discharge Instructions:  Patient is weight bearing as tolerated on the operative leg.  Patient is to progress range of motion and ambulation as tolerated.  Use walker as needed for stability and gait.  May progress to cane as tolerated.  The dressing should be left on knee until post-operative day 7, and then removed.  Dressing is waterproof. Patient may shower.  Use ace wrap as needed for swelling.  Patient will follow-up in the office in 2 weeks.  Call the office at [phone] for any questions or concerns.      Discharge Medications     Discharge Medications        New Medications        Instructions  Start Date   Aspirin Low Dose 81 MG EC tablet  Generic drug: aspirin   81 mg, Oral, 2 Times Daily      cephalexin 500 MG capsule  Commonly known as: KEFLEX   1,000 mg, Oral, 4 Times Daily      docusate sodium 100 MG capsule  Commonly known as: Colace   200 mg, Oral, 2 Times Daily      HYDROcodone-acetaminophen 7.5-325 MG per tablet  Commonly known as: Norco   1 tablet, Oral, Every 4 Hours PRN      ondansetron 4 MG tablet  Commonly known as: Zofran   4 mg, Oral, Daily PRN             Continue These Medications        Instructions Start Date   fluticasone 50 MCG/ACT nasal spray  Commonly known as: FLONASE   INSTRILL 2 SPRAY INTO THE NOSTRIL AS DIRECTED      hydrOXYzine 25 MG tablet  Commonly known as: ATARAX   25 mg, Oral, Every 6 Hours PRN      lamoTRIgine 200 MG tablet  Commonly known as: LaMICtal   200 mg, Oral, Daily      levothyroxine 100 MCG tablet  Commonly known as: SYNTHROID, LEVOTHROID   100 mcg, Oral, Daily      losartan 25 MG tablet  Commonly known as: COZAAR   TAKE 1 TABLET BY MOUTH DAILY FOR BLOOD PRESSURE      prazosin 5 MG capsule  Commonly known as: MINIPRESS   1 mg, Oral, 2 Times Daily      valACYclovir 500 MG tablet  Commonly known as: Valtrex   500 mg, Oral, Daily               Discharge Diet:   Diet Instructions       Advance Diet As Tolerated -Target Diet: regular diet      Target Diet: regular diet            Activity at Discharge:   Activity Instructions       Activity as Tolerated      Work Restrictions      Type of Restriction: Work    May Return to Work: Other    Return to Work Instructions: No work until cleared by physician's office.            Follow-up Appointments  No future appointments.  Additional Instructions for the Follow-ups that You Need to Schedule       Ambulatory Referral to Home Health   As directed      Face to Face Visit Date: 1/15/2024   Follow-up provider for Plan of Care?: I will be treating the patient on an ongoing basis.  Please send me the Plan of Care for  signature.   Follow-up provider: DAYTON PABON [[id number]]   Reason/Clinical Findings: s/p TKA   Describe mobility limitations that make leaving home difficult: Taxing effort   Nursing/Therapeutic Services Requested: Physical Therapy   PT orders: Total joint pathway   Frequency: 1 Week 1        Discharge Follow-up with Specified Provider: Dr. Pabon office.  An appointment has already been made at time of surgery scheduling.  Call office if you need to verify appointment time or date.  [phone]; 2 Weeks   As directed      To: Dr. Pabon office.  An appointment has already been made at time of surgery scheduling.  Call office if you need to verify appointment time or date.  [phone]   Follow Up: 2 Weeks                Test Results Pending at Discharge       Dayton Pabon MD  01/16/24,  07:20 EST

## 2024-01-17 ENCOUNTER — TELEPHONE (OUTPATIENT)
Dept: ORTHOPEDIC SURGERY | Facility: HOSPITAL | Age: 59
End: 2024-01-17
Payer: MEDICARE

## 2024-01-17 NOTE — TELEPHONE ENCOUNTER
Post op day 2  Discharge Instructions:  Ask patient about his or her discharge instructions  ?  Patient confirmed understanding   []  Further instruction needed   What, if any, recommendations, teaching, or interventions did you provide? Click or tap here to enter text.  Health status:  Pain controlled Yes   Pain is horrible today, but she knew it was coming. Taking the medication and it helps.   Recommended interventions:  Yes  incision/dressing status   ?  Clean without redness, drainage, odor  []  Redness    []  Drainage - color Click or tap here to enter text.  []  Odor  CLEMENTINA - Green light blinking Choose an item.  Difficulties urination No  Last BM 1/15/2024 (if no BM by day 3-recommend OTC suppository or fleets enema)  No BM, Taking the medications   Medications:  ?Medications reviewed with patient/family/caregiver  Patient taking medications as prescribed?   Yes  If not taking medications as prescribed, note specific medicine(s) and reason for each:  Click or tap here to enter text.  Hospital Follow Up Plan:  Follow up Appointment with Orthopedic surgeon:  ?Has f/u appointment                []Scheduled f/u appointment  Home Care ordered at discharge?    Yes        Home Care started, or contact made?    Yes   If no, action taken:   DME obtained/used in home?         Yes   Using IS  Choose an item.   Other information: Ms. Sharp said she was doing great till about 2 this morning with the block wore off. It's been rough, but she knew it was coming. She has been taking the pain medication and it helps some. She is icing, and elevating. She's walking around every hour or so. Caretenders came out yesterday and did their evaluation. She had like a 110 degree bend and 2 degree extension. She said it's nowhere close to that now, but she knew that would happen. Dressing looks good. Things are coming along. She hopes that this one won't be as bad, as he only had to replace the spacer. Ms. Sharp doesn't have any  questions for me at this time. She has my contact information should she need anything.

## 2024-02-12 ENCOUNTER — TELEPHONE (OUTPATIENT)
Dept: ORTHOPEDIC SURGERY | Facility: HOSPITAL | Age: 59
End: 2024-02-12
Payer: MEDICARE

## 2024-02-15 RX ORDER — LEVOTHYROXINE SODIUM 0.1 MG/1
100 TABLET ORAL DAILY
Qty: 90 TABLET | Refills: 2 | Status: SHIPPED | OUTPATIENT
Start: 2024-02-15

## 2024-11-11 ENCOUNTER — HOSPITAL ENCOUNTER (OUTPATIENT)
Facility: SURGERY CENTER | Age: 59
Setting detail: HOSPITAL OUTPATIENT SURGERY
Discharge: HOME | End: 2024-11-11
Attending: STUDENT IN AN ORGANIZED HEALTH CARE EDUCATION/TRAINING PROGRAM | Admitting: STUDENT IN AN ORGANIZED HEALTH CARE EDUCATION/TRAINING PROGRAM
Payer: MEDICARE

## 2024-11-11 VITALS
HEART RATE: 66 BPM | TEMPERATURE: 98 F | HEIGHT: 61 IN | BODY MASS INDEX: 33.79 KG/M2 | RESPIRATION RATE: 16 BRPM | OXYGEN SATURATION: 100 % | WEIGHT: 179 LBS | SYSTOLIC BLOOD PRESSURE: 140 MMHG | DIASTOLIC BLOOD PRESSURE: 73 MMHG

## 2024-11-11 PROCEDURE — 25000 INCISION OF TENDON SHEATH: CPT | Performed by: STUDENT IN AN ORGANIZED HEALTH CARE EDUCATION/TRAINING PROGRAM

## 2024-11-11 RX ORDER — SODIUM CHLORIDE 0.9 % (FLUSH) 0.9 %
10 SYRINGE (ML) INJECTION AS NEEDED
Status: DISCONTINUED | OUTPATIENT
Start: 2024-11-11 | End: 2024-11-11 | Stop reason: HOSPADM

## 2024-11-11 RX ORDER — LIDOCAINE HYDROCHLORIDE 10 MG/ML
0.5 INJECTION, SOLUTION INFILTRATION; PERINEURAL ONCE AS NEEDED
Status: DISCONTINUED | OUTPATIENT
Start: 2024-11-11 | End: 2024-11-11 | Stop reason: HOSPADM

## 2024-11-11 RX ORDER — GABAPENTIN 300 MG/1
CAPSULE ORAL
COMMUNITY
Start: 2024-08-13

## 2024-11-11 RX ORDER — AMOXICILLIN 500 MG/1
500 CAPSULE ORAL
COMMUNITY
Start: 2024-10-22

## 2024-11-11 RX ORDER — MAGNESIUM HYDROXIDE 1200 MG/15ML
LIQUID ORAL AS NEEDED
Status: DISCONTINUED | OUTPATIENT
Start: 2024-11-11 | End: 2024-11-11 | Stop reason: HOSPADM

## 2024-11-11 RX ORDER — HYDROCHLOROTHIAZIDE 12.5 MG/1
12.5 TABLET ORAL DAILY
COMMUNITY
Start: 2023-12-05 | End: 2025-04-19

## 2024-11-11 RX ORDER — CEPHALEXIN 500 MG/1
500 CAPSULE ORAL ONCE
Status: COMPLETED | OUTPATIENT
Start: 2024-11-11 | End: 2024-11-11

## 2024-11-11 RX ORDER — OLANZAPINE 15 MG/1
15 TABLET ORAL
COMMUNITY
Start: 2024-05-17

## 2024-11-11 RX ORDER — CHOLECALCIFEROL (VITAMIN D3) 25 MCG
1000 TABLET ORAL DAILY
COMMUNITY

## 2024-11-11 RX ADMIN — CEPHALEXIN 500 MG: 500 CAPSULE ORAL at 08:54

## 2024-11-11 NOTE — OP NOTE
[facility]  Procedure Note    Tea DANIELS  11/11/2024    Pre-op Diagnosis: Right first dorsal compartment tendinitis.   Post-op Diagnosis: Same  Procedure: Right first dorsal compartment release  Surgeon:  Charisma Garcia MD  Assistant: None  Anesthesia: Choice, No anesthesia staff entered.  Local anesthesia using 10 cc of Marcaine half percent buffered with 1 mL of bicarb with epinephrine  Staff: Circulator: Brigida Toro RN  Scrub Person: Maribel Dill RN  Monitor/Sedation Nurse: Love Morrison RN CFA  Estimated Blood Loss: None  Specimens: * No orders in the log *  Drains: none  Complications: None    Components Utilized:    Nothing was implanted during the procedure    Indication for Procedure:  This patient is a 59 y.o. female who has failed conservative management for right first dorsal compartment tendinitis including injections and bracing.  Surgical options and non-surgical options were discussed in detail and to the patient's satisfaction.  Surgical intervention was recommended based on the patient's injury and functional status.      The risks and benefits of surgery were discussed with patient and informed consent was obtained.  Risks include but are not limited to, infection, bleeding, nerve injury, blood clots, risks associated with anesthesia, need for further surgery, persistent pain, and possibly death.        DESCRIPTION OF PROCEDURE:     The patient was seen in Preoperative Holding Area where their surgical site was marked.  Under aseptic technique on her right hand where the incision was going to be I did inject 10 cc of Marcaine half percent buffered with bicarb with epinephrine she tolerated this well.    She was taken back to the operative suite where her right hand was prepped and draped in the normal sterile fashion including a prescript of alcohol followed by ChloraPrep.  Before the start of procedure timeout was formed making the right patient the right procedure  and all necessary, in the room.  I made a 1 cm longitudinal incision over the first dorsal compartment care was taken through the subcutaneous tissues the superficial radial nerve was identified and protected throughout the duration of the case.  The first also compartment was identified and incised with a 15 blade.    There was noted to be a separate compartment for the extensor pollicis brevis tendon which was identified and released.  There was synovitis surrounding both the APL and EPB tendons which was excised.  I did an extensive debridement of both.  I pulled both tendons into the wound there was no evidence of synovitis at the completion.  The compartment was stable with wrist ulnar and radial deviation.  The septum and the middle was removed.  The wound was copiously irrigated and the skin and subcutaneous tissues were closed with a 4-0 Monocryl in a running fashion.  Sterile dressing was applied including Steri-Strips and Mastisol.  A thumb spica splint was applied.  Complications none specimens none    Postoperative Plan:  Patient will keep the postoperative dressing on for 1 week then okay to remove and take a shower no soaking in bath or tubs.    Charisma Garcia MD

## 2024-11-11 NOTE — H&P
[facility]   PREOPERATIVE HISTORY AND PHYSICAL    Patient Name:Tea DANIELS  : 1965  MRN: [id number]  Primary Care Physician: Bartolome Costa DO  Date of admission: 2024    Subjective   Subjective     Chief Complaint: preoperative evaluation    History of Present Illness  Tea DANIELS is a 59 y.o. female who presents for preoperative evaluation. She is scheduled for RIGHT FIRST DORSAL COMPARTMENT RELEASE (Right) patient has failed conservative management for her right first dorsal compartment release and here today for operative intervention.    Review of Systems     Personal History     Past Medical History:   Diagnosis Date    Abnormal EKG     ADHD (attention deficit hyperactivity disorder)     Allergic     Seasonal    Anemia     Anorexia     Anxiety     Arthritis     Bipolar disorder     Chest pain     Depression     Diarrhea     Dysphagia     Encounter for screening mammogram for breast cancer 2016    Fatigue     Fibrocystic breast     Glaucoma     Health care maintenance     Hypercalcemia     Hypertension 2022    Hypothyroidism      ?    Nausea & vomiting     Obesity     Parathyroid adenoma     PONV (postoperative nausea and vomiting)     Rash     Thyroid dysfunction     thyroid nodules now    Tremor 2016    Secondary to Depakote, resolved with discontinuation seen neurology       Past Surgical History:   Procedure Laterality Date    APPENDECTOMY      COLONOSCOPY N/A 2016    Procedure: COLONOSCOPY into cecum and terminal ileum with biopsies;  Surgeon: Godwin Dee MD;  Location: [facility] ENDOSCOPY;  Service:     ENDOSCOPY N/A 2016    Procedure: ESOPHAGOGASTRODUODENOSCOPY with biopsies;  Surgeon: Godwin Dee MD;  Location: [facility] ENDOSCOPY;  Service:     HYSTERECTOMY      JOINT REPLACEMENT Bilateral 2020    3 MONTHS APART    KNEE ALLOGRAFT CARTILAGE IMPLANTATION  2010    SUBTOTAL HYSTERECTOMY  2006    THYROIDECTOMY, PARTIAL      right side     TONSILLECTOMY      TOTAL KNEE ARTHROPLASTY REVISION Left 1/15/2024    Procedure: LEFT TOTAL KNEE ARTHROPLASTY REVISION WITH POLYETHYLENE CHANGE;  Surgeon: Dayton Luis MD;  Location: [facility] OR [facility];  Service: Orthopedics;  Laterality: Left;    TUBAL ABDOMINAL LIGATION  1994       Family History: Her family history includes Alcohol abuse in her brother, father, and mother; Aortic aneurysm in her father; Breast cancer in her mother; Cancer in her mother; Colon cancer in her maternal grandfather; Depression in her mother; Heart attack in her mother; Heart disease in her mother; Hypertension in her mother; Macular degeneration in her mother; Stroke in her mother; Thyroid disease in her mother; Vision loss in her mother.     Social History: She  reports that she has never smoked. She has never used smokeless tobacco. She reports that she does not currently use alcohol. She reports that she does not use drugs.    Home Medications:  fluticasone, hydrOXYzine, lamoTRIgine, levothyroxine, losartan, prazosin, and valACYclovir    Allergies:  She is allergic to augmentin [amoxicillin-pot clavulanate], depakote [valproic acid], and sulfa antibiotics.    Objective    Objective     Vitals:         Physical Exam    Positive Finkelstein's on her right side.  Neurovascular intact    Assessment & Plan   Assessment / Plan     Brief Patient Summary:  Tea DANIELS is a 59 y.o. female who presents for preoperative evaluation.    Pre-Op Diagnosis Codes:      * Radial styloid tenosynovitis [M65.4]    Active Hospital Problems:  There are no active hospital problems to display for this patient.    Plan:   Procedure(s):  RIGHT FIRST DORSAL COMPARTMENT RELEASE    The risks, benefits, and alternatives of the procedure including but not limited to infection, bleeding, damage to structures the need for more surgery and risks of the anesthesia were discussed in detail with the patient and questions were answered. No guarantees were  made or implied. Informed consent was obtained.    Charisma Garcia MD

## (undated) DEVICE — SYRINGE, LUER LOCK, 60ML: Brand: MEDLINE

## (undated) DEVICE — PREP SOL POVIDONE/IODINE BT 4OZ

## (undated) DEVICE — GLV SURG BIOGEL LTX PF 7

## (undated) DEVICE — BNDG PLSTR SPECIALIST XFAST 4IN 5YD LF

## (undated) DEVICE — PREMIUM DRY TRAY LF: Brand: MEDLINE INDUSTRIES, INC.

## (undated) DEVICE — DRAPE,U/ SHT,SPLIT,PLAS,STERIL: Brand: MEDLINE

## (undated) DEVICE — SPNG GZ WOVN 4X4IN 12PLY 10/BX STRL

## (undated) DEVICE — TRAP FLD MINIVAC MEGADYNE 100ML

## (undated) DEVICE — STRAP STIRUP WO/ RNG

## (undated) DEVICE — DISPOSABLE TOURNIQUET CUFF SINGLE BLADDER, SINGLE PORT AND QUICK CONNECT CONNECTOR: Brand: COLOR CUFF

## (undated) DEVICE — PATIENT RETURN ELECTRODE, SINGLE-USE, CONTACT QUALITY MONITORING, ADULT, WITH 9FT CORD, FOR PATIENTS WEIGING OVER 33LBS. (15KG): Brand: MEGADYNE

## (undated) DEVICE — RECIPROCATING BLADE, DOUBLE SIDED, OFFSET  (70.0 X 0.64 X 12.6MM)

## (undated) DEVICE — PK KN TOTL 40

## (undated) DEVICE — ANTIBACTERIAL UNDYED BRAIDED (POLYGLACTIN 910), SYNTHETIC ABSORBABLE SUTURE: Brand: COATED VICRYL

## (undated) DEVICE — BNDG,ELSTC,MATRIX,STRL,4"X5YD,LF,HOOK&LP: Brand: MEDLINE

## (undated) DEVICE — APPL CHLORAPREP HI/LITE 26ML ORNG

## (undated) DEVICE — NEEDLE, QUINCKE, 18GX3.5": Brand: MEDLINE

## (undated) DEVICE — MAT FLR ABSORBENT LG 4FT 10 2.5FT

## (undated) DEVICE — DISPOSABLE BIPOLAR FORCEPS 4" (10.2CM) JEWELERS, STRAIGHT 0.4MM TIP AND 12 FT. (3.6M) CABLE: Brand: KIRWAN

## (undated) DEVICE — SOL NACL 0.9PCT 100ML SGL

## (undated) DEVICE — HAND PACK: Brand: MEDLINE INDUSTRIES, INC.

## (undated) DEVICE — DUAL CUT SAGITTAL BLADE

## (undated) DEVICE — SUT MNCRYL 4/0 PS2 18 IN

## (undated) DEVICE — INTENDED FOR TISSUE SEPARATION, AND OTHER PROCEDURES THAT REQUIRE A SHARP SURGICAL BLADE TO PUNCTURE OR CUT.: Brand: BARD-PARKER ® CARBON RIB-BACK BLADES

## (undated) DEVICE — SOL ISO/ALC 70PCT 4OZ

## (undated) DEVICE — OPTIFOAM GENTLE SA, POSTOP, 4X12: Brand: MEDLINE

## (undated) DEVICE — ADHS LIQ MASTISOL 2/3ML

## (undated) DEVICE — TBG PENCL TELESCP MEGADYNE SMOKE EVAC 10FT

## (undated) DEVICE — SOL ANTISEP SCRB HIBICLENS CHG4PCT 8OZ

## (undated) DEVICE — GLV SURG BIOGEL LTX PF 8

## (undated) DEVICE — STRIP,CLOSURE,WOUND,MEDI-STRIP,1/2X4: Brand: MEDLINE

## (undated) DEVICE — GLV SURG BIOGEL LTX PF 8 1/2

## (undated) DEVICE — WEBRIL* CAST PADDING: Brand: DEROYAL